# Patient Record
Sex: MALE | Race: BLACK OR AFRICAN AMERICAN | NOT HISPANIC OR LATINO | Employment: FULL TIME | URBAN - METROPOLITAN AREA
[De-identification: names, ages, dates, MRNs, and addresses within clinical notes are randomized per-mention and may not be internally consistent; named-entity substitution may affect disease eponyms.]

---

## 2017-01-16 ENCOUNTER — PATIENT OUTREACH (OUTPATIENT)
Dept: FAMILY MEDICINE CLINIC | Facility: CLINIC | Age: 32
End: 2017-01-16

## 2017-01-17 ENCOUNTER — OFFICE VISIT (OUTPATIENT)
Dept: FAMILY MEDICINE CLINIC | Facility: CLINIC | Age: 32
End: 2017-01-17

## 2017-01-17 VITALS
WEIGHT: 207 LBS | OXYGEN SATURATION: 100 % | SYSTOLIC BLOOD PRESSURE: 131 MMHG | DIASTOLIC BLOOD PRESSURE: 73 MMHG | HEIGHT: 72 IN | HEART RATE: 87 BPM | RESPIRATION RATE: 16 BRPM | TEMPERATURE: 98.1 F | BODY MASS INDEX: 28.04 KG/M2

## 2017-01-17 DIAGNOSIS — E11.8 TYPE 2 DIABETES MELLITUS WITH COMPLICATION, WITHOUT LONG-TERM CURRENT USE OF INSULIN (HCC): ICD-10-CM

## 2017-01-17 LAB
BUN SERPL-MCNC: 11 MG/DL (ref 6–20)
BUN/CREAT SERPL: 12.5 (ref 7–25)
CALCIUM SERPL-MCNC: 9.8 MG/DL (ref 8.6–10.5)
CHLORIDE SERPL-SCNC: 100 MMOL/L (ref 98–107)
CO2 SERPL-SCNC: 27.4 MMOL/L (ref 22–29)
CREAT SERPL-MCNC: 0.88 MG/DL (ref 0.76–1.27)
GLUCOSE SERPL-MCNC: 226 MG/DL (ref 65–99)
HBA1C MFR BLD: 8.61 % (ref 4.8–5.6)
POTASSIUM SERPL-SCNC: 4.5 MMOL/L (ref 3.5–5.2)
SODIUM SERPL-SCNC: 142 MMOL/L (ref 136–145)

## 2017-01-17 PROCEDURE — 99214 OFFICE O/P EST MOD 30 MIN: CPT | Performed by: FAMILY MEDICINE

## 2017-01-17 RX ORDER — GLIMEPIRIDE 2 MG/1
2 TABLET ORAL 2 TIMES DAILY
Qty: 180 TABLET | Refills: 0 | Status: SHIPPED | OUTPATIENT
Start: 2017-01-17 | End: 2017-08-07 | Stop reason: SDUPTHER

## 2017-01-17 NOTE — PATIENT INSTRUCTIONS
Exercise 30 minutes most days of the week  Sleep 6-8 hours each night if possible  Low fat, low cholesterol diet   we discussed prescribed medications and how to take them   make sure you get results of any labs/studies ordered today  Low glycemic index diet   bs  180 in am    Do labs today  And will call labs

## 2017-01-17 NOTE — MR AVS SNAPSHOT
Getachew Hammer   2017 9:00 AM   Office Visit    Provider:  Norm Rodriguez MD   Department:  [facility]   Dept Phone:  [phone]                Your Full Care Plan              Where to Get Your Medications      These medications were sent to [id number] - [place], [place] - [address] - [phone]  - [phone] FX  [address], [facility] [address]     Phone:  [phone]     glimepiride 2 MG tablet            Your Updated Medication List          This list is accurate as of: 17 10:07 AM.  Always use your most recent med list.                glimepiride 2 MG tablet   Commonly known as:  AMARYL   Take 1 tablet by mouth 2 (Two) Times a Day.               We Performed the Following     Basic Metabolic Panel     Hemoglobin A1c       You Were Diagnosed With        Codes Comments    Type 2 diabetes mellitus with complication, without long-term current use of insulin     ICD-10-CM: E11.8  ICD-9-CM: 250.90       Instructions    Exercise 30 minutes most days of the week  Sleep 6-8 hours each night if possible  Low fat, low cholesterol diet   we discussed prescribed medications and how to take them   make sure you get results of any labs/studies ordered today  Low glycemic index diet   bs  180 in am    Do labs today  And will call labs          Patient Instructions History      Frograms Signup     [facility] Frograms allows you to send messages to your doctor, view your test results, renew your prescriptions, schedule appointments, and more. To sign up, go to [employer] and click on the Sign Up Now link in the New User? box. Enter your [employer]grams Activation Code exactly as it appears below along with the last four digits of your Social Security Number and your Date of Birth () to complete the sign-up process. If you do not sign up before the expiration date, you must request a new code.    Frograms  "Activation Code: [email or web address]  Expires: 1/22/2017  5:40 AM    If you have questions, you can email [email or web address] or call [phone] to talk to our MyChart staff. Remember, [employer] is NOT to be used for urgent needs. For medical emergencies, dial 911.               Other Info from Your Visit           Allergies     No Known Allergies      Reason for Visit     Diabetes           Vital Signs     Blood Pressure Pulse Temperature Respirations Height Weight    131/73 87 98.1 °F (36.7 °C) (Oral) 16 72\" (182.9 cm) 207 lb (93.9 kg)    Oxygen Saturation Body Mass Index Smoking Status             100% 28.07 kg/m2 Current Some Day Smoker         Problems and Diagnoses Noted     Type 2 diabetes mellitus with manifestations         Care Plan (most recent)      Care Management - 01/16/17 1432     Lifestyle Goals    Lifestyle Goals Exercise a number of times per week   Pt was at work and did not have a lot of time to speak with me. One goal that he would like to set for himself is to exercise 3 times a week. Currently he is only going once a week to the gym. Pt feels like he eats well,  he avoids simple sugar.     Barriers    Barriers No time to exercise    Self Management     Self Management Increase Physical Activities;Other (See Comment)   Pt states he just needs to make more time to exercise. Will reach out to patient again before next appt with MD.       "

## 2017-01-17 NOTE — PROGRESS NOTES
"Subjective   Getachew Hammer is a 31 y.o. male.     History of Present Illness   Chief Complaint:   Chief Complaint   Patient presents with   • Diabetes       Getachew Hammer 31 y.o. male who presents today for a 3 month diabetic follow up. I refilled his medication for 3 months. He did not have labs for the appointment as requested. He is fasting today. .  he has a history of   Patient Active Problem List   Diagnosis   • Type 2 diabetes mellitus with complication   .  Since the last visit, he has overall felt well.  he has been compliant with   Current Outpatient Prescriptions:   •  glimepiride (AMARYL) 2 MG tablet, Take 1 tablet by mouth 2 (two) times a day., Disp: 180 tablet, Rfl: 1.  he denies medication side effects.    All of the chronic condition(s) listed above are stable w/o issues.    Visit Vitals   • /73   • Pulse 87   • Temp 98.1 °F (36.7 °C) (Oral)   • Resp 16   • Ht 72\" (182.9 cm)   • Wt 207 lb (93.9 kg)   • SpO2 100%   • BMI 28.07 kg/m2             The following portions of the patient's history were reviewed and updated as appropriate: allergies, current medications, past family history, past medical history, past social history, past surgical history and problem list.    Review of Systems   Constitutional: Negative for activity change, appetite change and unexpected weight change.   Eyes: Negative for visual disturbance.   Respiratory: Negative for chest tightness and shortness of breath.    Cardiovascular: Negative for chest pain and palpitations.   Skin: Negative for color change.   Neurological: Negative for speech difficulty.   Psychiatric/Behavioral: Negative for confusion and decreased concentration.       Objective   Physical Exam   Constitutional: He is oriented to person, place, and time. He appears well-developed and well-nourished.   HENT:   Head: Normocephalic.   Eyes: Pupils are equal, round, and reactive to light.   Neck: Normal range of motion.   Cardiovascular: Normal rate and " regular rhythm.    Pulmonary/Chest: Effort normal and breath sounds normal.   Neurological: He is alert and oriented to person, place, and time.   Skin: No rash noted.   Psychiatric: He has a normal mood and affect. His behavior is normal.   Nursing note and vitals reviewed.      Assessment/Plan   Getachew was seen today for diabetes.    Diagnoses and all orders for this visit:    Type 2 diabetes mellitus with complication, without long-term current use of insulin  -     glimepiride (AMARYL) 2 MG tablet; Take 1 tablet by mouth 2 (Two) Times a Day.  -     Basic Metabolic Panel  -     Hemoglobin A1c

## 2017-01-19 DIAGNOSIS — E11.8 TYPE 2 DIABETES MELLITUS WITH COMPLICATION, WITHOUT LONG-TERM CURRENT USE OF INSULIN (HCC): Primary | ICD-10-CM

## 2017-03-19 ENCOUNTER — RESULTS ENCOUNTER (OUTPATIENT)
Dept: FAMILY MEDICINE CLINIC | Facility: CLINIC | Age: 32
End: 2017-03-19

## 2017-03-19 DIAGNOSIS — E11.8 TYPE 2 DIABETES MELLITUS WITH COMPLICATION, WITHOUT LONG-TERM CURRENT USE OF INSULIN (HCC): ICD-10-CM

## 2017-05-05 DIAGNOSIS — E11.8 TYPE 2 DIABETES MELLITUS WITH COMPLICATION, WITHOUT LONG-TERM CURRENT USE OF INSULIN (HCC): ICD-10-CM

## 2017-05-08 RX ORDER — GLIMEPIRIDE 2 MG/1
TABLET ORAL
Qty: 180 TABLET | Refills: 0 | OUTPATIENT
Start: 2017-05-08

## 2017-08-07 ENCOUNTER — OFFICE VISIT (OUTPATIENT)
Dept: FAMILY MEDICINE CLINIC | Facility: CLINIC | Age: 32
End: 2017-08-07

## 2017-08-07 VITALS
OXYGEN SATURATION: 99 % | WEIGHT: 208 LBS | DIASTOLIC BLOOD PRESSURE: 80 MMHG | SYSTOLIC BLOOD PRESSURE: 120 MMHG | HEIGHT: 72 IN | BODY MASS INDEX: 28.17 KG/M2 | RESPIRATION RATE: 16 BRPM | TEMPERATURE: 98.4 F | HEART RATE: 84 BPM

## 2017-08-07 DIAGNOSIS — Z23 IMMUNIZATION DUE: ICD-10-CM

## 2017-08-07 DIAGNOSIS — R21 RASH OF HANDS: ICD-10-CM

## 2017-08-07 DIAGNOSIS — E11.8 TYPE 2 DIABETES MELLITUS WITH COMPLICATION, WITHOUT LONG-TERM CURRENT USE OF INSULIN (HCC): Primary | ICD-10-CM

## 2017-08-07 DIAGNOSIS — E78.2 MIXED HYPERLIPIDEMIA: ICD-10-CM

## 2017-08-07 PROCEDURE — 99214 OFFICE O/P EST MOD 30 MIN: CPT | Performed by: FAMILY MEDICINE

## 2017-08-07 PROCEDURE — 90715 TDAP VACCINE 7 YRS/> IM: CPT | Performed by: FAMILY MEDICINE

## 2017-08-07 PROCEDURE — 90471 IMMUNIZATION ADMIN: CPT | Performed by: FAMILY MEDICINE

## 2017-08-07 RX ORDER — GLIMEPIRIDE 2 MG/1
2 TABLET ORAL 2 TIMES DAILY
Qty: 180 TABLET | Refills: 1 | Status: CANCELLED | OUTPATIENT
Start: 2017-08-07

## 2017-08-07 RX ORDER — GLIMEPIRIDE 2 MG/1
2 TABLET ORAL 2 TIMES DAILY
Qty: 180 TABLET | Refills: 1 | Status: SHIPPED | OUTPATIENT
Start: 2017-08-07 | End: 2018-10-01 | Stop reason: ALTCHOICE

## 2017-08-07 NOTE — PROGRESS NOTES
"Subjective   Getachew Hammer is a 32 y.o. male.     History of Present Illness   Chief Complaint:   Chief Complaint   Patient presents with   • Rash     left hand       Getachew Hammer 32 y.o. male who presents today for Medical management of below issue with medication refills. He complains of a rash on his left hand that has been present for a month. Non specific rash with itching top of left hand  Small area  Try hydrocortisone cream bid  Also discuss diabetes   See last labs  Elevated need to take amaryl  As doing  Not checking bs. Do labs and record bs and see me 1 month,. He had a TDAP in the office today.    he has a history of   Patient Active Problem List   Diagnosis   • Type 2 diabetes mellitus with complication   .  Since the last visit, he has overall felt well.  he has been compliant with   Current Outpatient Prescriptions:   •  glimepiride (AMARYL) 2 MG tablet, Take 1 tablet by mouth 2 (Two) Times a Day., Disp: 180 tablet, Rfl: 0.  he denies medication side effects.    All of the chronic condition(s) listed above are stable w/o issues.    /80  Pulse 84  Temp 98.4 °F (36.9 °C) (Oral)   Resp 16  Ht 72\" (182.9 cm)  Wt 208 lb (94.3 kg)  SpO2 99%  BMI 28.21 kg/m2    Results for orders placed or performed in visit on 01/17/17   Basic Metabolic Panel   Result Value Ref Range    Glucose 226 (H) 65 - 99 mg/dL    BUN 11 6 - 20 mg/dL    Creatinine 0.88 0.76 - 1.27 mg/dL    eGFR Non African Am 100 >60 mL/min/1.73    eGFR African Am 122 >60 mL/min/1.73    BUN/Creatinine Ratio 12.5 7.0 - 25.0    Sodium 142 136 - 145 mmol/L    Potassium 4.5 3.5 - 5.2 mmol/L    Chloride 100 98 - 107 mmol/L    Total CO2 27.4 22.0 - 29.0 mmol/L    Calcium 9.8 8.6 - 10.5 mg/dL   Hemoglobin A1c   Result Value Ref Range    Hemoglobin A1C 8.61 (H) 4.80 - 5.60 %         The following portions of the patient's history were reviewed and updated as appropriate: allergies, current medications, past family history, past medical " history, past social history, past surgical history and problem list.    Review of Systems   Constitutional: Negative for activity change, appetite change and unexpected weight change.   Eyes: Negative for visual disturbance.   Respiratory: Negative for cough and chest tightness.    Cardiovascular: Negative for chest pain and palpitations.   Musculoskeletal: Positive for back pain.   Skin: Positive for rash. Negative for color change.   Neurological: Negative for syncope and speech difficulty.   Psychiatric/Behavioral: Negative for confusion and decreased concentration.       Objective   Physical Exam   Constitutional: He is oriented to person, place, and time. He appears well-developed and well-nourished.   Eyes: Pupils are equal, round, and reactive to light.   Neck: Normal range of motion. Neck supple. No thyromegaly present.   Cardiovascular: Normal rate and regular rhythm.    Pulmonary/Chest: Effort normal and breath sounds normal.   Abdominal: Soft. Bowel sounds are normal.   Musculoskeletal: Normal range of motion.   Neurological: He is alert and oriented to person, place, and time.   Skin: Skin is warm and dry. No rash noted.   Psychiatric: He has a normal mood and affect. His behavior is normal. Thought content normal.   Nursing note and vitals reviewed.      Assessment/Plan   Getachew was seen today for rash, diabetes and back pain.    Diagnoses and all orders for this visit:    Type 2 diabetes mellitus with complication, without long-term current use of insulin  -     glimepiride (AMARYL) 2 MG tablet; Take 1 tablet by mouth 2 (Two) Times a Day.  -     Hemoglobin A1c  -     Comprehensive Metabolic Panel  -     Lipid Panel  -     Urinalysis With Microscopic    Rash of hands  -     Hemoglobin A1c  -     Comprehensive Metabolic Panel  -     Lipid Panel  -     Urinalysis With Microscopic    Immunization due  -     Tdap Vaccine Greater Than or Equal To 6yo IM  -     Hemoglobin A1c  -     Comprehensive Metabolic  Panel  -     Lipid Panel  -     Urinalysis With Microscopic    Mixed hyperlipidemia  -     Hemoglobin A1c  -     Comprehensive Metabolic Panel  -     Lipid Panel  -     Urinalysis With Microscopic    Other orders  -     Cancel: glimepiride (AMARYL) 2 MG tablet; Take 1 tablet by mouth 2 (Two) Times a Day.

## 2017-08-07 NOTE — PATIENT INSTRUCTIONS
Exercise 30 minutes most days of the week  Sleep 6-8 hours each night if possible  Low fat, low cholesterol diet   we discussed prescribed medications and how to take them   make sure you get results of any labs/studies ordered today  Low glycemic index diet     Log bs

## 2018-10-01 ENCOUNTER — OFFICE VISIT (OUTPATIENT)
Dept: FAMILY MEDICINE CLINIC | Facility: CLINIC | Age: 33
End: 2018-10-01

## 2018-10-01 VITALS
WEIGHT: 198 LBS | RESPIRATION RATE: 16 BRPM | OXYGEN SATURATION: 98 % | HEIGHT: 72 IN | HEART RATE: 87 BPM | SYSTOLIC BLOOD PRESSURE: 126 MMHG | BODY MASS INDEX: 26.82 KG/M2 | DIASTOLIC BLOOD PRESSURE: 84 MMHG | TEMPERATURE: 97.4 F

## 2018-10-01 DIAGNOSIS — R73.9 HYPERGLYCEMIA: ICD-10-CM

## 2018-10-01 DIAGNOSIS — E11.8 TYPE 2 DIABETES MELLITUS WITH COMPLICATION, WITHOUT LONG-TERM CURRENT USE OF INSULIN (HCC): Primary | ICD-10-CM

## 2018-10-01 DIAGNOSIS — M54.50 LOW BACK PAIN WITHOUT SCIATICA, UNSPECIFIED BACK PAIN LATERALITY, UNSPECIFIED CHRONICITY: ICD-10-CM

## 2018-10-01 DIAGNOSIS — R11.2 NAUSEA AND VOMITING, INTRACTABILITY OF VOMITING NOT SPECIFIED, UNSPECIFIED VOMITING TYPE: ICD-10-CM

## 2018-10-01 PROCEDURE — 99214 OFFICE O/P EST MOD 30 MIN: CPT | Performed by: FAMILY MEDICINE

## 2018-10-01 RX ORDER — GLIPIZIDE 5 MG/1
5 TABLET, FILM COATED, EXTENDED RELEASE ORAL DAILY
Qty: 30 TABLET | Refills: 0 | Status: SHIPPED | OUTPATIENT
Start: 2018-10-01 | End: 2018-10-17 | Stop reason: SDUPTHER

## 2018-10-01 RX ORDER — GLIMEPIRIDE 2 MG/1
2 TABLET ORAL
Qty: 180 TABLET | Refills: 1 | Status: CANCELLED | OUTPATIENT
Start: 2018-10-01

## 2018-10-01 NOTE — PROGRESS NOTES
Subjective   Chief Complaint:   Chief Complaint   Patient presents with   • Fatigue     Blood Sugar Taken  Left Middle finger 257   • Back Pain   • Abdominal Pain   • Headache         History of Present Illness   I have not seen patient for over one year.  Sugar today was 257 he's been out of his Amaryl 2 mg twice a day for months.  He has not been our office for over a year.  Last LSR was August 2017.  His blood pressures 126/84.  Heart rate 87.  Temperature is 97.4.  He does not have a fever chills or anything.  It been off his Amaryl 2 mg twice a day for months.  He has a viral syndrome.  I will get labs on him today now.  I probably want to see him back tomorrow afternoon if I get the labs back but I'll call him in the morning to see what labs I have back.  Told him to hydrate himself today.  eNva get labs now.  Would've him on Glucotrol XL 5 mg 1 a day.  And his blood sugar today was 257.  Blood sugar I have in the chart was 226 and the hemoglobin A1c was 8.61.  Labs now and I will see him back tomorrow afternoon possibly 5 the labs back.  He can call me or go to the ER if he has any more vomiting or anything like that.            Getachew Hammer 33 y.o. male who presents today for Medical Management of the below listed issues and medication refills.  Her stance go to the ER if he starts vomiting and gets sicker.  I'm going to call him his labs in the morning.  He's going to go to the lab now.     he has a problem list of   Patient Active Problem List   Diagnosis   • Type 2 diabetes mellitus with complication (CMS/[facility])   .  Since the last visit, he has overall felt well.  he has been compliant with   Current Outpatient Prescriptions:   •  glipiZIDE (GLUCOTROL XL) 5 MG ER tablet, Take 1 tablet by mouth Daily., Disp: 30 tablet, Rfl: 0.  he denies medication side effects.  Did not take metformin because of vomiting.    All of the chronic condition(s) listed above are stable w/o issues.    /84   Pulse 87    "Temp 97.4 °F (36.3 °C)   Resp 16   Ht 182.9 cm (72\")   Wt 89.8 kg (198 lb)   SpO2 98%   BMI 26.85 kg/m²     Results for orders placed or performed in visit on 01/17/17   Basic Metabolic Panel   Result Value Ref Range    Glucose 226 (H) 65 - 99 mg/dL    BUN 11 6 - 20 mg/dL    Creatinine 0.88 0.76 - 1.27 mg/dL    eGFR Non African Am 100 >60 mL/min/1.73    eGFR African Am 122 >60 mL/min/1.73    BUN/Creatinine Ratio 12.5 7.0 - 25.0    Sodium 142 136 - 145 mmol/L    Potassium 4.5 3.5 - 5.2 mmol/L    Chloride 100 98 - 107 mmol/L    Total CO2 27.4 22.0 - 29.0 mmol/L    Calcium 9.8 8.6 - 10.5 mg/dL   Hemoglobin A1c   Result Value Ref Range    Hemoglobin A1C 8.61 (H) 4.80 - 5.60 %             The following portions of the patient's history were reviewed and updated as appropriate: allergies, current medications, past family history, past medical history, past social history, past surgical history and problem list.    Review of Systems   Constitutional: Positive for fatigue. Negative for chills, fever and unexpected weight change.   HENT: Negative for sinus pain and sinus pressure.    Eyes: Negative for visual disturbance.   Respiratory: Negative for shortness of breath.    Cardiovascular: Negative for chest pain.   Gastrointestinal: Negative for abdominal distention, abdominal pain, nausea and vomiting.   Genitourinary: Negative for decreased urine volume and frequency.   Musculoskeletal: Positive for back pain.   Skin: Negative for rash.   Neurological: Positive for weakness and headaches.   Psychiatric/Behavioral: The patient is not nervous/anxious.        Objective   Physical Exam   Constitutional: He is oriented to person, place, and time.   HENT:   Head: Normocephalic.   Right Ear: External ear normal.   Mouth/Throat: No oropharyngeal exudate.   Eyes: Pupils are equal, round, and reactive to light.   Neck: Normal range of motion.   Cardiovascular: Normal rate and regular rhythm.    Pulmonary/Chest: Effort normal " and breath sounds normal. No respiratory distress. He has no wheezes. He has no rales.   Abdominal: Soft. Bowel sounds are normal. There is no tenderness.   Musculoskeletal: He exhibits no tenderness.   Lymphadenopathy:     He has no cervical adenopathy.   Neurological: He is alert and oriented to person, place, and time. No sensory deficit.   Skin: No erythema.   Psychiatric: He has a normal mood and affect. His behavior is normal.   Nursing note and vitals reviewed.      Assessment/Plan   Getachew was seen today for fatigue, back pain, abdominal pain and headache.    Diagnoses and all orders for this visit:    Type 2 diabetes mellitus with complication, without long-term current use of insulin (CMS/[facility])  Comments:  bs 257 and non compliant  Orders:  -     Comprehensive metabolic panel  -     Lipid panel  -     CBC and Differential  -     TSH  -     Urinalysis With Microscopic - Urine, Clean Catch  -     Urine Culture - Urine, Urine, Clean Catch  -     Hemoglobin A1c  -     Ambulatory Referral to Endocrinology    Nausea and vomiting, intractability of vomiting not specified, unspecified vomiting type  -     Comprehensive metabolic panel  -     Lipid panel  -     CBC and Differential  -     TSH  -     Urinalysis With Microscopic - Urine, Clean Catch  -     Urine Culture - Urine, Urine, Clean Catch  -     Hemoglobin A1c  -     Ambulatory Referral to Endocrinology    Hyperglycemia  -     Comprehensive metabolic panel  -     Lipid panel  -     CBC and Differential  -     TSH  -     Urinalysis With Microscopic - Urine, Clean Catch  -     Urine Culture - Urine, Urine, Clean Catch  -     Hemoglobin A1c  -     Ambulatory Referral to Endocrinology    Low back pain without sciatica, unspecified back pain laterality, unspecified chronicity  -     Comprehensive metabolic panel  -     Lipid panel  -     CBC and Differential  -     TSH  -     Urinalysis With Microscopic - Urine, Clean Catch  -     Urine Culture - Urine, Urine,  Clean Catch  -     Hemoglobin A1c  -     Ambulatory Referral to Endocrinology    Other orders  -     Cancel: glimepiride (AMARYL) 2 MG tablet; Take 1 tablet by mouth Every Morning Before Breakfast.  -     glipiZIDE (GLUCOTROL XL) 5 MG ER tablet; Take 1 tablet by mouth Daily.

## 2018-10-01 NOTE — PATIENT INSTRUCTIONS
Exercise 30 minutes most days of the week  Sleep 6-8 hours each night if possible  Low fat, low cholesterol diet   we discussed prescribed medications and how to take them   make sure you get results of any labs/studies ordered today  Low glycemic index diet   Tlabs nowo er if any problems    Will call you and see you tuesday

## 2018-10-03 LAB
ALBUMIN SERPL-MCNC: 5.1 G/DL (ref 3.5–5.2)
ALBUMIN/GLOB SERPL: 1.8 G/DL
ALP SERPL-CCNC: 78 U/L (ref 39–117)
ALT SERPL-CCNC: 21 U/L (ref 1–41)
APPEARANCE UR: CLEAR
AST SERPL-CCNC: 16 U/L (ref 1–40)
BACTERIA #/AREA URNS HPF: ABNORMAL /HPF
BACTERIA UR CULT: NO GROWTH
BACTERIA UR CULT: NORMAL
BASOPHILS # BLD AUTO: 0.04 10*3/MM3 (ref 0–0.2)
BASOPHILS NFR BLD AUTO: 0.6 % (ref 0–1.5)
BILIRUB SERPL-MCNC: 1 MG/DL (ref 0.1–1.2)
BILIRUB UR QL STRIP: NEGATIVE
BUN SERPL-MCNC: 12 MG/DL (ref 6–20)
BUN/CREAT SERPL: 13.5 (ref 7–25)
CALCIUM SERPL-MCNC: 10.4 MG/DL (ref 8.6–10.5)
CASTS URNS MICRO: ABNORMAL
CHLORIDE SERPL-SCNC: 97 MMOL/L (ref 98–107)
CHOLEST SERPL-MCNC: 191 MG/DL (ref 0–200)
CO2 SERPL-SCNC: 26.8 MMOL/L (ref 22–29)
COLOR UR: YELLOW
CREAT SERPL-MCNC: 0.89 MG/DL (ref 0.76–1.27)
DIFFERENTIAL COMMENT: NORMAL
EOSINOPHIL # BLD AUTO: 0.12 10*3/MM3 (ref 0–0.7)
EOSINOPHIL NFR BLD AUTO: 1.8 % (ref 0.3–6.2)
EPI CELLS #/AREA URNS HPF: ABNORMAL /HPF
ERYTHROCYTE [DISTWIDTH] IN BLOOD BY AUTOMATED COUNT: 14.1 % (ref 11.5–14.5)
GLOBULIN SER CALC-MCNC: 2.9 GM/DL
GLUCOSE SERPL-MCNC: 285 MG/DL (ref 65–99)
GLUCOSE UR QL: (no result)
HBA1C MFR BLD: 10.61 % (ref 4.8–5.6)
HCT VFR BLD AUTO: 46.1 % (ref 40.4–52.2)
HDLC SERPL-MCNC: 58 MG/DL (ref 40–60)
HGB BLD-MCNC: 14.7 G/DL (ref 13.7–17.6)
HGB UR QL STRIP: NEGATIVE
IMM GRANULOCYTES # BLD: 0.01 10*3/MM3 (ref 0–0.03)
IMM GRANULOCYTES NFR BLD: 0.1 % (ref 0–0.5)
KETONES UR QL STRIP: (no result)
LDLC SERPL CALC-MCNC: 116 MG/DL (ref 0–100)
LEUKOCYTE ESTERASE UR QL STRIP: NEGATIVE
LYMPHOCYTES # BLD AUTO: 2.45 10*3/MM3 (ref 0.9–4.8)
LYMPHOCYTES NFR BLD AUTO: 36.4 % (ref 19.6–45.3)
MCH RBC QN AUTO: 22.4 PG (ref 27–32.7)
MCHC RBC AUTO-ENTMCNC: 31.9 G/DL (ref 32.6–36.4)
MCV RBC AUTO: 70.2 FL (ref 79.8–96.2)
MONOCYTES # BLD AUTO: 0.49 10*3/MM3 (ref 0.2–1.2)
MONOCYTES NFR BLD AUTO: 7.3 % (ref 5–12)
NEUTROPHILS # BLD AUTO: 3.64 10*3/MM3 (ref 1.9–8.1)
NEUTROPHILS NFR BLD AUTO: 53.9 % (ref 42.7–76)
NITRITE UR QL STRIP: NEGATIVE
PH UR STRIP: 6.5 [PH] (ref 5–8)
PLATELET # BLD AUTO: 312 10*3/MM3 (ref 140–500)
PLATELET BLD QL SMEAR: NORMAL
POTASSIUM SERPL-SCNC: 4.9 MMOL/L (ref 3.5–5.2)
PROT SERPL-MCNC: 8 G/DL (ref 6–8.5)
PROT UR QL STRIP: (no result)
RBC # BLD AUTO: 6.57 10*6/MM3 (ref 4.6–6)
RBC #/AREA URNS HPF: ABNORMAL /HPF
RBC MORPH BLD: NORMAL
SODIUM SERPL-SCNC: 139 MMOL/L (ref 136–145)
SP GR UR: (no result) (ref 1–1.03)
TRIGL SERPL-MCNC: 87 MG/DL (ref 0–150)
TSH SERPL DL<=0.005 MIU/L-ACNC: 1.09 MIU/ML (ref 0.27–4.2)
UROBILINOGEN UR STRIP-MCNC: (no result) MG/DL
VLDLC SERPL CALC-MCNC: 17.4 MG/DL (ref 5–40)
WBC # BLD AUTO: 6.74 10*3/MM3 (ref 4.5–10.7)
WBC #/AREA URNS HPF: ABNORMAL /HPF

## 2018-10-17 ENCOUNTER — OFFICE VISIT (OUTPATIENT)
Dept: FAMILY MEDICINE CLINIC | Facility: CLINIC | Age: 33
End: 2018-10-17

## 2018-10-17 VITALS
OXYGEN SATURATION: 98 % | HEART RATE: 84 BPM | TEMPERATURE: 98.2 F | SYSTOLIC BLOOD PRESSURE: 120 MMHG | BODY MASS INDEX: 27.5 KG/M2 | RESPIRATION RATE: 16 BRPM | HEIGHT: 72 IN | DIASTOLIC BLOOD PRESSURE: 80 MMHG | WEIGHT: 203 LBS

## 2018-10-17 DIAGNOSIS — E11.8 TYPE 2 DIABETES MELLITUS WITH COMPLICATION, WITHOUT LONG-TERM CURRENT USE OF INSULIN (HCC): Primary | ICD-10-CM

## 2018-10-17 PROCEDURE — 99214 OFFICE O/P EST MOD 30 MIN: CPT | Performed by: FAMILY MEDICINE

## 2018-10-17 RX ORDER — GLIPIZIDE 5 MG/1
5 TABLET, FILM COATED, EXTENDED RELEASE ORAL DAILY
Qty: 90 TABLET | Refills: 1 | Status: SHIPPED | OUTPATIENT
Start: 2018-10-17 | End: 2019-11-15

## 2018-10-17 NOTE — PATIENT INSTRUCTIONS
Exercise 30 minutes most days of the week  Sleep 6-8 hours each night if possible  Low fat, low cholesterol diet   we discussed prescribed medications and how to take them   make sure you get results of any labs/studies ordered today  Low glycemic index diet   Add januvia  100mg q day   Call me list of bs 2 weeks   Patient

## 2018-10-17 NOTE — PROGRESS NOTES
"Subjective   Chief Complaint: No chief complaint on file.        History of Present Illness is a follow-up visit to 2 weeks ago when he came in with a flu syndrome with high blood sugar.  Sugar that time was 257 and his hemoglobin A1c was 10.6.  Had a viral syndrome probably.  Not on anything for his diabetes.  Him Glucotrol XL 5 one a day comes in today for follow-up and to go over his labs.  Again his blood sugar was 257 and his hemoglobin A1c was 10.61  Two  weeks ago.  He feels better now and he is able to be hydrated now.  His blood sugar at home was in 59 last night at bedtime.  I'm going to continue with the Glucotrol XL 5 in the morning and him and add Januvia 100 mg 1 a day at supper.  He is to call me in 2 weeks his labs.  I told him to check blood sugars in the morning and then before supper and then at bedtime as much better now.  I reviewed all his labs he did not have a urinary tract infection.  His white count was not elevated.  And he feels fine now.  Range him to watch for low blood sugars.            Getachew Hammer 33 y.o. male who presents today for Medical Management of the below listed issues and medication refills.    ICD-10-CM ICD-9-CM   1. Type 2 diabetes mellitus with complication, without long-term current use of insulin (CMS/[facility]) E11.8 250.90        he has a problem list of   Patient Active Problem List   Diagnosis   • Type 2 diabetes mellitus with complication (CMS/[facility])   .  Since the last visit, he has overall felt well.  he has been compliant with   Current Outpatient Prescriptions:   •  glipiZIDE (GLUCOTROL XL) 5 MG ER tablet, Take 1 tablet by mouth Daily., Disp: 30 tablet, Rfl: 0.  he denies medication side effects.    All of the chronic condition(s) listed above are stable w/o issues.    /80   Pulse 84   Temp 98.2 °F (36.8 °C)   Resp 16   Ht 182.9 cm (72\")   Wt 92.1 kg (203 lb)   SpO2 98%   BMI 27.53 kg/m²     Results for orders placed or performed in visit on 10/01/18 "   Urine Culture - Urine, Urine, Clean Catch   Result Value Ref Range    Urine Culture Final report     Result 1 No growth    Comprehensive metabolic panel   Result Value Ref Range    Glucose 285 (H) 65 - 99 mg/dL    BUN 12 6 - 20 mg/dL    Creatinine 0.89 0.76 - 1.27 mg/dL    eGFR Non African Am 98 >60 mL/min/1.73    eGFR African Am 119 >60 mL/min/1.73    BUN/Creatinine Ratio 13.5 7.0 - 25.0    Sodium 139 136 - 145 mmol/L    Potassium 4.9 3.5 - 5.2 mmol/L    Chloride 97 (L) 98 - 107 mmol/L    Total CO2 26.8 22.0 - 29.0 mmol/L    Calcium 10.4 8.6 - 10.5 mg/dL    Total Protein 8.0 6.0 - 8.5 g/dL    Albumin 5.10 3.50 - 5.20 g/dL    Globulin 2.9 gm/dL    A/G Ratio 1.8 g/dL    Total Bilirubin 1.0 0.1 - 1.2 mg/dL    Alkaline Phosphatase 78 39 - 117 U/L    AST (SGOT) 16 1 - 40 U/L    ALT (SGPT) 21 1 - 41 U/L   Lipid panel   Result Value Ref Range    Total Cholesterol 191 0 - 200 mg/dL    Triglycerides 87 0 - 150 mg/dL    HDL Cholesterol 58 40 - 60 mg/dL    VLDL Cholesterol 17.4 5 - 40 mg/dL    LDL Cholesterol  116 (H) 0 - 100 mg/dL   TSH   Result Value Ref Range    TSH 1.090 0.270 - 4.200 mIU/mL   Hemoglobin A1c   Result Value Ref Range    Hemoglobin A1C 10.61 (H) 4.80 - 5.60 %   Microscopic Examination   Result Value Ref Range    WBC, UA 0-2 /hpf    RBC, UA 3-5 (A) /hpf    Epithelial Cells (non renal) 0-2 /hpf    Cast Type Comment     Bacteria, UA Comment None Seen /hpf   CBC and Differential   Result Value Ref Range    WBC 6.74 4.50 - 10.70 10*3/mm3    RBC 6.57 (H) 4.60 - 6.00 10*6/mm3    Hemoglobin 14.7 13.7 - 17.6 g/dL    Hematocrit 46.1 40.4 - 52.2 %    MCV 70.2 (L) 79.8 - 96.2 fL    MCH 22.4 (L) 27.0 - 32.7 pg    MCHC 31.9 (L) 32.6 - 36.4 g/dL    RDW 14.1 11.5 - 14.5 %    Platelets 312 140 - 500 10*3/mm3    Neutrophil Rel % 53.9 42.7 - 76.0 %    Lymphocyte Rel % 36.4 19.6 - 45.3 %    Monocyte Rel % 7.3 5.0 - 12.0 %    Eosinophil Rel % 1.8 0.3 - 6.2 %    Basophil Rel % 0.6 0.0 - 1.5 %    Neutrophils Absolute 3.64 1.90  - 8.10 10*3/mm3    Lymphocytes Absolute 2.45 0.90 - 4.80 10*3/mm3    Monocytes Absolute 0.49 0.20 - 1.20 10*3/mm3    Eosinophils Absolute 0.12 0.00 - 0.70 10*3/mm3    Basophils Absolute 0.04 0.00 - 0.20 10*3/mm3    Immature Granulocyte Rel % 0.1 0.0 - 0.5 %    Immature Grans Absolute 0.01 0.00 - 0.03 10*3/mm3   Urinalysis With Microscopic - Urine, Clean Catch   Result Value Ref Range    Specific Gravity, UA Comment 1.005 - 1.030    pH, UA 6.5 5.0 - 8.0    Color, UA Yellow     Appearance, UA Clear Clear    Leukocytes, UA Negative Negative    Protein Trace (A) Negative    Glucose, UA See below: (A) Negative    Ketones See below: (A) Negative    Blood, UA Negative Negative    Bilirubin, UA Negative Negative    Urobilinogen, UA Comment     Nitrite, UA Negative Negative   Manual Differential   Result Value Ref Range    Differential Comment Comment     Comment Comment     Plt Comment Comment              The following portions of the patient's history were reviewed and updated as appropriate: allergies, current medications, past family history, past medical history, past social history, past surgical history and problem list.    Review of Systems   Constitutional: Negative for activity change, appetite change and unexpected weight change.   Eyes: Negative for visual disturbance.   Respiratory: Negative for chest tightness and shortness of breath.    Cardiovascular: Negative for chest pain and palpitations.   Skin: Negative for color change.   Neurological: Negative for syncope and speech difficulty.   Psychiatric/Behavioral: Negative for confusion and decreased concentration.       Objective   Physical Exam   Constitutional: He is oriented to person, place, and time. He appears well-developed and well-nourished.   HENT:   Head: Atraumatic.   Mouth/Throat: Oropharynx is clear and moist.   Eyes: Pupils are equal, round, and reactive to light. EOM are normal.   Neck: Normal range of motion. Neck supple. No thyromegaly  present.   Cardiovascular: Normal rate and regular rhythm.    Pulmonary/Chest: Effort normal and breath sounds normal.   Abdominal: Soft.   Musculoskeletal: Normal range of motion.   Neurological: He is alert and oriented to person, place, and time.   Skin: Skin is warm and dry.   Psychiatric: He has a normal mood and affect. His behavior is normal.   Nursing note and vitals reviewed.      Assessment/Plan   Diagnoses and all orders for this visit:    Type 2 diabetes mellitus with complication, without long-term current use of insulin (CMS/[facility])    Other orders  -     glipiZIDE (GLUCOTROL XL) 5 MG ER tablet; Take 1 tablet by mouth Daily.

## 2018-12-10 ENCOUNTER — OFFICE VISIT (OUTPATIENT)
Dept: ENDOCRINOLOGY | Age: 33
End: 2018-12-10

## 2018-12-10 VITALS
BODY MASS INDEX: 28.31 KG/M2 | HEIGHT: 72 IN | SYSTOLIC BLOOD PRESSURE: 128 MMHG | WEIGHT: 209 LBS | DIASTOLIC BLOOD PRESSURE: 72 MMHG

## 2018-12-10 DIAGNOSIS — E78.5 DYSLIPIDEMIA: ICD-10-CM

## 2018-12-10 DIAGNOSIS — R53.82 CHRONIC FATIGUE: ICD-10-CM

## 2018-12-10 DIAGNOSIS — E55.9 VITAMIN D DEFICIENCY: ICD-10-CM

## 2018-12-10 DIAGNOSIS — E11.9 TYPE 2 DIABETES MELLITUS WITHOUT COMPLICATION, WITHOUT LONG-TERM CURRENT USE OF INSULIN (HCC): Primary | ICD-10-CM

## 2018-12-10 DIAGNOSIS — N52.9 ERECTILE DYSFUNCTION, UNSPECIFIED ERECTILE DYSFUNCTION TYPE: ICD-10-CM

## 2018-12-10 PROCEDURE — 99204 OFFICE O/P NEW MOD 45 MIN: CPT | Performed by: INTERNAL MEDICINE

## 2018-12-10 RX ORDER — ERTUGLIFLOZIN 15 MG/1
15 TABLET, FILM COATED ORAL EVERY MORNING
Qty: 30 TABLET | Refills: 5 | Status: SHIPPED | OUTPATIENT
Start: 2018-12-10 | End: 2019-03-13 | Stop reason: SDUPTHER

## 2018-12-10 RX ORDER — SITAGLIPTIN AND METFORMIN HYDROCHLORIDE 1000; 50 MG/1; MG/1
2 TABLET, FILM COATED, EXTENDED RELEASE ORAL DAILY
Qty: 60 TABLET | Refills: 5 | Status: SHIPPED | OUTPATIENT
Start: 2018-12-10 | End: 2019-03-13 | Stop reason: SDUPTHER

## 2018-12-10 RX ORDER — PIOGLITAZONEHYDROCHLORIDE 30 MG/1
30 TABLET ORAL DAILY
Qty: 30 TABLET | Refills: 11 | Status: SHIPPED | OUTPATIENT
Start: 2018-12-10 | End: 2019-04-25

## 2018-12-10 NOTE — PROGRESS NOTES
"Subjective   Getachew Hammer is a 33 y.o. male is here for as a new patient for type 2 dm. Lab review. Pt is testing BG at least once a day. He is unsure why he is here. /72   Ht 182.9 cm (72\")   Wt 94.8 kg (209 lb)   BMI 28.35 kg/m²   No Known Allergies    Current Outpatient Medications:   •  glipiZIDE (GLUCOTROL XL) 5 MG ER tablet, Take 1 tablet by mouth Daily., Disp: 90 tablet, Rfl: 1      History of Present Illness this is a 33-year-old gentleman who is being referred for further evaluation and treatment of type II diabetes.  He says she has had type II diabetes since age 20 and he took metformin generic for some time and because of GI intolerance he stopped using it and for the rest of his the 20s he really did not care and did not take care of himself.  He is now  and has a steady job and children.  Family history is a strong for type II diabetes and cardiovascular problems.  He is complaining of erectile dysfunction.    The following portions of the patient's history were reviewed and updated as appropriate: allergies, current medications, past family history, past medical history, past social history, past surgical history and problem list.    Review of Systems   Constitutional: Negative for fatigue.   HENT: Negative for trouble swallowing.    Eyes: Negative for visual disturbance.   Cardiovascular: Negative for leg swelling.   Endocrine: Negative for polyphagia.   Skin: Negative for wound.   Neurological: Negative for numbness.       Objective   Physical Exam   Constitutional: He is oriented to person, place, and time. He appears well-developed and well-nourished. No distress.   HENT:   Head: Normocephalic and atraumatic.   Right Ear: External ear normal.   Left Ear: External ear normal.   Nose: Nose normal.   Mouth/Throat: Oropharynx is clear and moist. No oropharyngeal exudate.   Eyes: Conjunctivae and EOM are normal. Pupils are equal, round, and reactive to light. Right eye exhibits no " discharge. Left eye exhibits no discharge. No scleral icterus.   Neck: Normal range of motion. Neck supple. No JVD present. No tracheal deviation present. No thyromegaly present.   Cardiovascular: Normal rate, regular rhythm, normal heart sounds and intact distal pulses. Exam reveals no gallop and no friction rub.   No murmur heard.  Pulmonary/Chest: Effort normal and breath sounds normal. No stridor. No respiratory distress. He has no wheezes. He has no rales. He exhibits no tenderness.   Abdominal: Soft. Bowel sounds are normal. He exhibits no distension and no mass. There is no tenderness. There is no rebound and no guarding. No hernia.   Musculoskeletal: Normal range of motion. He exhibits no edema, tenderness or deformity.   Lymphadenopathy:     He has no cervical adenopathy.   Neurological: He is alert and oriented to person, place, and time. He displays normal reflexes. No cranial nerve deficit or sensory deficit. He exhibits normal muscle tone. Coordination normal.   Skin: Skin is warm and dry. No rash noted. He is not diaphoretic. No erythema. No pallor.   Psychiatric: He has a normal mood and affect. His behavior is normal. Judgment and thought content normal.   Nursing note and vitals reviewed.        Assessment/Plan   Diagnoses and all orders for this visit:    Type 2 diabetes mellitus without complication, without long-term current use of insulin (CMS/[facility])  -     T4 & TSH ([facility])  -     TestT+TestF+SHBG  -     Uric Acid  -     Vitamin D 25 Hydroxy  -     Comprehensive Metabolic Panel  -     C-Peptide  -     Follicle Stimulating Hormone  -     Hemoglobin A1c  -     Lipid Panel  -     Luteinizing Hormone  -     MicroAlbumin, Urine, Random - Urine, Clean Catch  -     PSA DIAGNOSTIC  -     Prolactin  -     ACTH  -     Cortisol  -     Ambulatory Referral to Podiatry  -     T4 & TSH ([facility]); Future  -     TestT+TestF+SHBG; Future  -     Uric Acid; Future  -     Vitamin D 25 Hydroxy; Future  -      Comprehensive Metabolic Panel; Future  -     C-Peptide; Future  -     Hemoglobin A1c; Future  -     Lipid Panel; Future  -     MicroAlbumin, Urine, Random - Urine, Clean Catch; Future    Chronic fatigue  -     T4 & TSH ([facility])  -     TestT+TestF+SHBG  -     Uric Acid  -     Vitamin D 25 Hydroxy  -     Comprehensive Metabolic Panel  -     C-Peptide  -     Follicle Stimulating Hormone  -     Hemoglobin A1c  -     Lipid Panel  -     Luteinizing Hormone  -     MicroAlbumin, Urine, Random - Urine, Clean Catch  -     PSA DIAGNOSTIC  -     Prolactin  -     ACTH  -     Cortisol  -     T4 & TSH ([facility]); Future  -     TestT+TestF+SHBG; Future  -     Uric Acid; Future  -     Vitamin D 25 Hydroxy; Future  -     Comprehensive Metabolic Panel; Future  -     C-Peptide; Future  -     Hemoglobin A1c; Future  -     Lipid Panel; Future  -     MicroAlbumin, Urine, Random - Urine, Clean Catch; Future    Vitamin D deficiency  -     T4 & TSH ([facility])  -     TestT+TestF+SHBG  -     Uric Acid  -     Vitamin D 25 Hydroxy  -     Comprehensive Metabolic Panel  -     C-Peptide  -     Follicle Stimulating Hormone  -     Hemoglobin A1c  -     Lipid Panel  -     Luteinizing Hormone  -     MicroAlbumin, Urine, Random - Urine, Clean Catch  -     PSA DIAGNOSTIC  -     Prolactin  -     ACTH  -     Cortisol  -     T4 & TSH ([facility]); Future  -     TestT+TestF+SHBG; Future  -     Uric Acid; Future  -     Vitamin D 25 Hydroxy; Future  -     Comprehensive Metabolic Panel; Future  -     C-Peptide; Future  -     Hemoglobin A1c; Future  -     Lipid Panel; Future  -     MicroAlbumin, Urine, Random - Urine, Clean Catch; Future    Dyslipidemia  -     T4 & TSH ([facility])  -     TestT+TestF+SHBG  -     Uric Acid  -     Vitamin D 25 Hydroxy  -     Comprehensive Metabolic Panel  -     C-Peptide  -     Follicle Stimulating Hormone  -     Hemoglobin A1c  -     Lipid Panel  -     Luteinizing Hormone  -     MicroAlbumin, Urine, Random - Urine, Clean Catch  -      PSA DIAGNOSTIC  -     Prolactin  -     ACTH  -     Cortisol  -     T4 & TSH ([facility]); Future  -     TestT+TestF+SHBG; Future  -     Uric Acid; Future  -     Vitamin D 25 Hydroxy; Future  -     Comprehensive Metabolic Panel; Future  -     C-Peptide; Future  -     Hemoglobin A1c; Future  -     Lipid Panel; Future  -     MicroAlbumin, Urine, Random - Urine, Clean Catch; Future    Erectile dysfunction, unspecified erectile dysfunction type  -     T4 & TSH ([facility]); Future  -     TestT+TestF+SHBG; Future  -     Uric Acid; Future  -     Vitamin D 25 Hydroxy; Future  -     Comprehensive Metabolic Panel; Future  -     C-Peptide; Future  -     Hemoglobin A1c; Future  -     Lipid Panel; Future  -     MicroAlbumin, Urine, Random - Urine, Clean Catch; Future    Other orders  -     JANUMET XR  MG tablet; Take 2 tablets by mouth Daily.  -     STEGLATRO 15 MG tablet; Take 1 tablet by mouth Every Morning.  -     pioglitazone (ACTOS) 30 MG tablet; Take 1 tablet by mouth Daily.               In his summary I saw and examined this 33-year-old gentleman for above-mentioned problems.  I reviewed his laboratory evaluation of 10/01/2018 and at this time we will go ahead and order a more extensive laboratory evaluation and once the results come back we will go ahead and call for any possible modification or new medications.  In the meantime I started him on Janumet XR 50/1000 mg to be taken 2 tablets with supper and also stick O5 milligrams every morning for 3 weeks and if there was no problem he should go to 15 mg every morning and Actos 30 mg daily.  I am also referring him for diabetic foot care to Dr. Leo Walker.  He will see Ms. Ragini Shah in 4 months or sooner if needed with laboratory evaluation prior to each office visit.

## 2018-12-12 LAB
25(OH)D3+25(OH)D2 SERPL-MCNC: 18.6 NG/ML (ref 30–100)
ACTH PLAS-MCNC: 36.1 PG/ML (ref 7.2–63.3)
ALBUMIN SERPL-MCNC: 5.1 G/DL (ref 3.5–5.2)
ALBUMIN/GLOB SERPL: 2.1 G/DL
ALP SERPL-CCNC: 65 U/L (ref 39–117)
ALT SERPL-CCNC: 81 U/L (ref 1–41)
AST SERPL-CCNC: 155 U/L (ref 1–40)
BILIRUB SERPL-MCNC: 0.6 MG/DL (ref 0.1–1.2)
BUN SERPL-MCNC: 9 MG/DL (ref 6–20)
BUN/CREAT SERPL: 10.1 (ref 7–25)
C PEPTIDE SERPL-MCNC: 1.2 NG/ML (ref 1.1–4.4)
CALCIUM SERPL-MCNC: 10.5 MG/DL (ref 8.6–10.5)
CHLORIDE SERPL-SCNC: 100 MMOL/L (ref 98–107)
CHOLEST SERPL-MCNC: 169 MG/DL (ref 0–200)
CO2 SERPL-SCNC: 29.2 MMOL/L (ref 22–29)
CORTIS SERPL-MCNC: 6.6 UG/DL
CREAT SERPL-MCNC: 0.89 MG/DL (ref 0.76–1.27)
FSH SERPL-ACNC: 6.8 MIU/ML (ref 1.5–12.4)
GLOBULIN SER CALC-MCNC: 2.4 GM/DL
GLUCOSE SERPL-MCNC: 234 MG/DL (ref 65–99)
HBA1C MFR BLD: 8.06 % (ref 4.8–5.6)
HDLC SERPL-MCNC: 53 MG/DL (ref 40–60)
INTERPRETATION: NORMAL
LDLC SERPL CALC-MCNC: 104 MG/DL (ref 0–100)
LH SERPL-ACNC: 5.1 MIU/ML (ref 1.7–8.6)
Lab: NORMAL
MICROALBUMIN UR-MCNC: 27.5 UG/ML
POTASSIUM SERPL-SCNC: 4.6 MMOL/L (ref 3.5–5.2)
PROLACTIN SERPL-MCNC: 11.3 NG/ML (ref 4–15.2)
PROT SERPL-MCNC: 7.5 G/DL (ref 6–8.5)
PSA SERPL-MCNC: 1.92 NG/ML (ref 0–4)
SHBG SERPL-SCNC: 23.4 NMOL/L (ref 16.5–55.9)
SODIUM SERPL-SCNC: 141 MMOL/L (ref 136–145)
T4 SERPL-MCNC: 8.27 MCG/DL (ref 4.5–11.7)
TESTOST FREE SERPL-MCNC: 9.9 PG/ML (ref 8.7–25.1)
TESTOST SERPL-MCNC: 279 NG/DL (ref 264–916)
TRIGL SERPL-MCNC: 60 MG/DL (ref 0–150)
TSH SERPL DL<=0.005 MIU/L-ACNC: 1.41 MIU/ML (ref 0.27–4.2)
URATE SERPL-MCNC: 4.8 MG/DL (ref 3.4–7)
VLDLC SERPL CALC-MCNC: 12 MG/DL (ref 5–40)

## 2018-12-12 RX ORDER — ERGOCALCIFEROL 1.25 MG/1
50000 CAPSULE ORAL 2 TIMES WEEKLY
Qty: 26 CAPSULE | Refills: 3 | Status: SHIPPED | OUTPATIENT
Start: 2018-12-13 | End: 2019-03-13 | Stop reason: SDUPTHER

## 2019-03-12 ENCOUNTER — TELEPHONE (OUTPATIENT)
Dept: ENDOCRINOLOGY | Age: 34
End: 2019-03-12

## 2019-03-12 NOTE — TELEPHONE ENCOUNTER
----- Message from Sharyn Ricks MD sent at 3/10/2019  1:01 PM EDT -----  Contact: patient   Before trying to change the medications I want to be sure that he is taking at with full stomach.  And also it is okay if he can tolerated better by taking it twice daily with breakfast and supper.  Let me know if these things do not work  ----- Message -----  From: Reina Grossman MA  Sent: 3/8/2019   3:55 PM  To: Sharyn Ricks MD        ----- Message -----  From: Crystal Walter RegSched Rep  Sent: 3/8/2019   2:28 PM  To: Reina Grossman MA    Patient stated JANUMET XR  MG tablet is messing with his stomach. Patient just found the letter that was dated 12/12/18.  If patients JANUMET XR  MG tablet  Can be changed please send to       [id number] - [place], [place] - [address] - [phone]  - [phone] (Phone)  [phone] (Fax)    Patient would like a call to let him know if script is going to be changed. Best # for patient [phone].    Patient stated as he was looking at the letter that his testosterone was low and he didn't know if Dr. Ricks was going to do anything about this or if the Vit D has something to do with it.        Left patient a detailed voicemail in regards to Dr. Ricks message.

## 2019-03-13 RX ORDER — SITAGLIPTIN AND METFORMIN HYDROCHLORIDE 1000; 50 MG/1; MG/1
2 TABLET, FILM COATED, EXTENDED RELEASE ORAL DAILY
Qty: 60 TABLET | Refills: 5 | Status: SHIPPED | OUTPATIENT
Start: 2019-03-13 | End: 2019-04-03

## 2019-03-13 RX ORDER — ERTUGLIFLOZIN 15 MG/1
15 TABLET, FILM COATED ORAL EVERY MORNING
Qty: 30 TABLET | Refills: 5 | Status: SHIPPED | OUTPATIENT
Start: 2019-03-13 | End: 2019-11-15

## 2019-03-13 RX ORDER — ERGOCALCIFEROL 1.25 MG/1
50000 CAPSULE ORAL 2 TIMES WEEKLY
Qty: 26 CAPSULE | Refills: 3 | Status: SHIPPED | OUTPATIENT
Start: 2019-03-14 | End: 2019-04-25 | Stop reason: SDUPTHER

## 2019-03-29 ENCOUNTER — RESULTS ENCOUNTER (OUTPATIENT)
Dept: ENDOCRINOLOGY | Age: 34
End: 2019-03-29

## 2019-03-29 DIAGNOSIS — E55.9 VITAMIN D DEFICIENCY: ICD-10-CM

## 2019-03-29 DIAGNOSIS — E78.5 DYSLIPIDEMIA: ICD-10-CM

## 2019-03-29 DIAGNOSIS — E11.9 TYPE 2 DIABETES MELLITUS WITHOUT COMPLICATION, WITHOUT LONG-TERM CURRENT USE OF INSULIN (HCC): Primary | ICD-10-CM

## 2019-03-29 DIAGNOSIS — E11.9 TYPE 2 DIABETES MELLITUS WITHOUT COMPLICATION, WITHOUT LONG-TERM CURRENT USE OF INSULIN (HCC): ICD-10-CM

## 2019-03-29 DIAGNOSIS — R53.82 CHRONIC FATIGUE: ICD-10-CM

## 2019-03-29 DIAGNOSIS — N52.9 ERECTILE DYSFUNCTION, UNSPECIFIED ERECTILE DYSFUNCTION TYPE: ICD-10-CM

## 2019-04-03 RX ORDER — SITAGLIPTIN AND METFORMIN HYDROCHLORIDE 1000; 50 MG/1; MG/1
1 TABLET, FILM COATED ORAL 2 TIMES DAILY WITH MEALS
Qty: 60 TABLET | Refills: 11 | Status: SHIPPED | OUTPATIENT
Start: 2019-04-03 | End: 2019-04-15

## 2019-04-09 ENCOUNTER — LAB (OUTPATIENT)
Dept: ENDOCRINOLOGY | Age: 34
End: 2019-04-09

## 2019-04-09 DIAGNOSIS — E78.5 DYSLIPIDEMIA: ICD-10-CM

## 2019-04-09 DIAGNOSIS — E55.9 VITAMIN D DEFICIENCY: ICD-10-CM

## 2019-04-09 DIAGNOSIS — E11.9 TYPE 2 DIABETES MELLITUS WITHOUT COMPLICATION, WITHOUT LONG-TERM CURRENT USE OF INSULIN (HCC): ICD-10-CM

## 2019-04-10 LAB
25(OH)D3+25(OH)D2 SERPL-MCNC: 26.3 NG/ML (ref 30–100)
ALBUMIN SERPL-MCNC: 5.1 G/DL (ref 3.5–5.2)
ALBUMIN/GLOB SERPL: 2.1 G/DL
ALP SERPL-CCNC: 72 U/L (ref 39–117)
ALT SERPL-CCNC: 18 U/L (ref 1–41)
AST SERPL-CCNC: 16 U/L (ref 1–40)
BILIRUB SERPL-MCNC: 0.7 MG/DL (ref 0.2–1.2)
BUN SERPL-MCNC: 14 MG/DL (ref 6–20)
BUN/CREAT SERPL: 15.6 (ref 7–25)
C PEPTIDE SERPL-MCNC: 0.9 NG/ML (ref 1.1–4.4)
CALCIUM SERPL-MCNC: 10.1 MG/DL (ref 8.6–10.5)
CHLORIDE SERPL-SCNC: 99 MMOL/L (ref 98–107)
CHOLEST SERPL-MCNC: 168 MG/DL (ref 0–200)
CO2 SERPL-SCNC: 24.6 MMOL/L (ref 22–29)
CREAT SERPL-MCNC: 0.9 MG/DL (ref 0.76–1.27)
GLOBULIN SER CALC-MCNC: 2.4 GM/DL
GLUCOSE SERPL-MCNC: 193 MG/DL (ref 65–99)
HBA1C MFR BLD: 8.54 % (ref 4.8–5.6)
HDLC SERPL-MCNC: 53 MG/DL (ref 40–60)
INTERPRETATION: NORMAL
LDLC SERPL CALC-MCNC: 101 MG/DL (ref 0–100)
Lab: NORMAL
POTASSIUM SERPL-SCNC: 4.6 MMOL/L (ref 3.5–5.2)
PROT SERPL-MCNC: 7.5 G/DL (ref 6–8.5)
SODIUM SERPL-SCNC: 140 MMOL/L (ref 136–145)
TRIGL SERPL-MCNC: 68 MG/DL (ref 0–150)
VLDLC SERPL CALC-MCNC: 13.6 MG/DL (ref 5–40)

## 2019-04-15 RX ORDER — ALOGLIPTIN AND METFORMIN HYDROCHLORIDE 12.5; 1 MG/1; MG/1
1 TABLET, FILM COATED ORAL 2 TIMES DAILY
Qty: 60 TABLET | Refills: 5 | Status: SHIPPED | OUTPATIENT
Start: 2019-04-15 | End: 2019-04-25

## 2019-04-25 ENCOUNTER — OFFICE VISIT (OUTPATIENT)
Dept: ENDOCRINOLOGY | Age: 34
End: 2019-04-25

## 2019-04-25 VITALS
HEIGHT: 72 IN | DIASTOLIC BLOOD PRESSURE: 74 MMHG | WEIGHT: 204 LBS | BODY MASS INDEX: 27.63 KG/M2 | SYSTOLIC BLOOD PRESSURE: 124 MMHG

## 2019-04-25 DIAGNOSIS — E11.9 TYPE 2 DIABETES MELLITUS WITHOUT COMPLICATION, WITHOUT LONG-TERM CURRENT USE OF INSULIN (HCC): Primary | ICD-10-CM

## 2019-04-25 DIAGNOSIS — N52.9 ERECTILE DYSFUNCTION, UNSPECIFIED ERECTILE DYSFUNCTION TYPE: ICD-10-CM

## 2019-04-25 DIAGNOSIS — E78.5 DYSLIPIDEMIA: ICD-10-CM

## 2019-04-25 DIAGNOSIS — E78.2 MIXED HYPERLIPIDEMIA: ICD-10-CM

## 2019-04-25 DIAGNOSIS — E55.9 VITAMIN D DEFICIENCY: ICD-10-CM

## 2019-04-25 PROCEDURE — 99214 OFFICE O/P EST MOD 30 MIN: CPT | Performed by: NURSE PRACTITIONER

## 2019-04-25 RX ORDER — ALOGLIPTIN AND METFORMIN HYDROCHLORIDE 12.5; 1 MG/1; MG/1
1 TABLET, FILM COATED ORAL 2 TIMES DAILY
Qty: 60 TABLET | Refills: 5 | Status: SHIPPED | OUTPATIENT
Start: 2019-04-25 | End: 2019-04-25

## 2019-04-25 RX ORDER — SITAGLIPTIN AND METFORMIN HYDROCHLORIDE 1000; 50 MG/1; MG/1
2 TABLET, FILM COATED, EXTENDED RELEASE ORAL DAILY
Qty: 60 TABLET | Refills: 5 | Status: SHIPPED | OUTPATIENT
Start: 2019-04-25 | End: 2019-05-02 | Stop reason: CLARIF

## 2019-04-25 RX ORDER — PIOGLITAZONEHYDROCHLORIDE 30 MG/1
30 TABLET ORAL DAILY
Qty: 30 TABLET | Refills: 5 | Status: SHIPPED | OUTPATIENT
Start: 2019-04-25 | End: 2019-11-15

## 2019-04-25 RX ORDER — ERGOCALCIFEROL 1.25 MG/1
CAPSULE ORAL
Qty: 36 CAPSULE | Refills: 1 | Status: SHIPPED | OUTPATIENT
Start: 2019-04-25

## 2019-04-25 NOTE — PATIENT INSTRUCTIONS
janumet XR 50/1000 2 tabs once daily with food  If blood sugars do not improve let office know  pioglitizone 30 mg once daily  Continue steglatro  Stop glipizide   Bring meter each visit  Sensor to be placed when we have them  Increase vit d to 1 cap 3 times weekly

## 2019-04-25 NOTE — PROGRESS NOTES
"Subjective   Getachew Hammer is a 34 y.o. male is here today for follow-up.  Chief Complaint   Patient presents with   • Diabetes     recent labs, testing BG 2 times daily, pt did not bring meter   • Hyperlipidemia     pt is not taking pioglitazone and janumet   • Vitamin D Deficiency     /74   Ht 182.9 cm (72\")   Wt 92.5 kg (204 lb)   BMI 27.67 kg/m²   Current Outpatient Medications on File Prior to Visit   Medication Sig   • ergocalciferol (DRISDOL) 34896 units capsule Take 1 capsule by mouth 2 (Two) Times a Week.   • glipiZIDE (GLUCOTROL XL) 5 MG ER tablet Take 1 tablet by mouth Daily.   • STEGLATRO 15 MG tablet Take 1 tablet by mouth Every Morning.   • [DISCONTINUED] Alogliptin-metFORMIN HCl 12.5-1000 MG tablet Take 1 tablet by mouth 2 (Two) Times a Day.   • [DISCONTINUED] pioglitazone (ACTOS) 30 MG tablet Take 1 tablet by mouth Daily.     No current facility-administered medications on file prior to visit.      Family History   Problem Relation Age of Onset   • Diabetes Mother    • Hyperlipidemia Mother    • Stroke Mother    • Arthritis Mother    • Hyperlipidemia Father    • Diabetes Maternal Grandmother    • Hyperlipidemia Maternal Grandmother    • Hyperlipidemia Maternal Grandfather    • Hyperlipidemia Paternal Grandmother    • Hyperlipidemia Paternal Grandfather    • Diabetes Sister    • Glaucoma Other      Social History     Tobacco Use   • Smoking status: Current Some Day Smoker     Types: Cigars   • Smokeless tobacco: Never Used   Substance Use Topics   • Alcohol use: Yes     Comment: social   • Drug use: Not on file     No Known Allergies      History of Present Illness  Encounter Diagnoses   Name Primary?   • Vitamin D deficiency    • Type 2 diabetes mellitus without complication, without long-term current use of insulin (CMS/[facility]) Yes   • Dyslipidemia    • Erectile dysfunction, unspecified erectile dysfunction type      34-year-old male patient here today for a follow-up visit.  He has been " seen for the above-mentioned problems.  He has had problems getting Janumet Pioglitizone from his pharmacy and despite trying to utilize coupons for the Janumet he was given the prescriptions.  New prescriptions have been sent and he was given samples along with the new coupons at today's visit.  He is been advised to contact the office so that we can verify if the formulary issue we will change the medication.  His hemoglobin A1c reflects uncontrolled type 2 diabetes.  His C-peptide is low and he has restarted himself on glipizide due to not having the other medications that were prescribed.  He was educated today regarding mechanism of action of his current medications.  He was educated today regarding hemoglobin A1c.  He has not had any hypoglycemic events.  He is taking his vitamin D twice weekly consistently and is still vitamin D deficient.  He does have a family history of high cholesterol and heart disease.  We discussed treating his LDL cholesterol however he wants to wait to see if his diabetes control improves before we add additional medication.  The following portions of the patient's history were reviewed and updated as appropriate: allergies, current medications, past family history, past medical history, past social history, past surgical history and problem list.    Review of Systems   Constitutional: Negative for fatigue.   HENT: Negative for trouble swallowing.    Eyes: Negative for visual disturbance.   Cardiovascular: Negative for leg swelling.   Endocrine: Negative for polyuria.   Skin: Negative for wound.   Neurological: Negative for numbness.       Objective   Physical Exam   Constitutional: He is oriented to person, place, and time. He appears well-developed and well-nourished. No distress.   HENT:   Head: Normocephalic and atraumatic.   Right Ear: External ear normal.   Left Ear: External ear normal.   Nose: Nose normal.   Mouth/Throat: Oropharynx is clear and moist. No oropharyngeal  exudate.   Eyes: Conjunctivae and EOM are normal. Pupils are equal, round, and reactive to light. Right eye exhibits no discharge. Left eye exhibits no discharge. No scleral icterus.   Neck: Normal range of motion. Neck supple. No JVD present. No tracheal deviation present. No thyromegaly present.   Cardiovascular: Normal rate, regular rhythm, normal heart sounds and intact distal pulses. Exam reveals no gallop and no friction rub.   No murmur heard.  Pulmonary/Chest: Effort normal and breath sounds normal. No stridor. No respiratory distress. He has no wheezes. He has no rales. He exhibits no tenderness.   Abdominal: Soft. Bowel sounds are normal. He exhibits no distension and no mass. There is no tenderness. There is no rebound and no guarding. No hernia.   Musculoskeletal: Normal range of motion. He exhibits no edema, tenderness or deformity.   Lymphadenopathy:     He has no cervical adenopathy.   Neurological: He is alert and oriented to person, place, and time. He displays normal reflexes. No cranial nerve deficit or sensory deficit. He exhibits normal muscle tone. Coordination normal.   Skin: Skin is warm and dry. No rash noted. He is not diaphoretic. No erythema. No pallor.   Psychiatric: He has a normal mood and affect. His behavior is normal. Judgment and thought content normal.   Nursing note and vitals reviewed.      Results for orders placed or performed in visit on 04/09/19   C-Peptide   Result Value Ref Range    C-Peptide 0.9 (L) 1.1 - 4.4 ng/mL   Hemoglobin A1c   Result Value Ref Range    Hemoglobin A1C 8.54 (H) 4.80 - 5.60 %   Vitamin D 25 Hydroxy   Result Value Ref Range    25 Hydroxy, Vitamin D 26.3 (L) 30.0 - 100.0 ng/ml   Lipid Panel   Result Value Ref Range    Total Cholesterol 168 0 - 200 mg/dL    Triglycerides 68 0 - 150 mg/dL    HDL Cholesterol 53 40 - 60 mg/dL    VLDL Cholesterol 13.6 5 - 40 mg/dL    LDL Cholesterol  101 (H) 0 - 100 mg/dL   Comprehensive Metabolic Panel   Result Value Ref  Range    Glucose 193 (H) 65 - 99 mg/dL    BUN 14 6 - 20 mg/dL    Creatinine 0.90 0.76 - 1.27 mg/dL    eGFR Non African Am 96 >60 mL/min/1.73    eGFR African Am 116 >60 mL/min/1.73    BUN/Creatinine Ratio 15.6 7.0 - 25.0    Sodium 140 136 - 145 mmol/L    Potassium 4.6 3.5 - 5.2 mmol/L    Chloride 99 98 - 107 mmol/L    Total CO2 24.6 22.0 - 29.0 mmol/L    Calcium 10.1 8.6 - 10.5 mg/dL    Total Protein 7.5 6.0 - 8.5 g/dL    Albumin 5.10 3.50 - 5.20 g/dL    Globulin 2.4 gm/dL    A/G Ratio 2.1 g/dL    Total Bilirubin 0.7 0.2 - 1.2 mg/dL    Alkaline Phosphatase 72 39 - 117 U/L    AST (SGOT) 16 1 - 40 U/L    ALT (SGPT) 18 1 - 41 U/L   Cardiovascular Risk Assessment   Result Value Ref Range    Interpretation Note    Diabetes Patient Education   Result Value Ref Range    PDF Image Not applicable        Assessment/Plan   Problems Addressed this Visit        Digestive    Vitamin D deficiency       Endocrine    Type 2 diabetes mellitus without complication, without long-term current use of insulin (CMS/[facility]) - Primary       Genitourinary    Erectile dysfunction       Other    Dyslipidemia        Summary, patient was seen and examined.  Metabolically he is stable however his A1c is not at goal of less than 7.  He has been unable to get some of his medications that were prescribed by Dr. Ricks.  As result he is restarted himself on Glucotrol.  Based on his recent labs he has been advised to stop Glucotrol and instead try to get Janumet and Pioglitizone that was prescribed for him originally.  He will continue on Steglatro.  He has been advised to bring his blood glucose meter with him to each visit.  I wanted to put a continuous glucose monitoring sensor on him at today's visit however we are out of them and he will stop by next week to have one placed.  Lina the medical assistant was supposed to contact him once we have some available for him to stop by.  He has been advised to contact the office if his blood sugars fail to  improve.

## 2019-08-16 ENCOUNTER — TELEPHONE (OUTPATIENT)
Dept: ENDOCRINOLOGY | Age: 34
End: 2019-08-16

## 2019-08-16 NOTE — TELEPHONE ENCOUNTER
Pt has been informed of samples    ----- Message from Renee Lisa MA sent at 8/15/2019 12:46 PM EDT -----  Contact: PT  PT CALLED ASK IF HE CAN GET SAMPLES OF JANUMET, HE DOES NOT KNOW WHAT MCG HE TAKES. PLEASE ADVISE PT IF HE CAN GET SAMPLES.

## 2019-11-03 LAB
25(OH)D3+25(OH)D2 SERPL-MCNC: 44.4 NG/ML (ref 30–100)
ALBUMIN SERPL-MCNC: 4.9 G/DL (ref 3.5–5.2)
ALBUMIN/GLOB SERPL: 2.2 G/DL
ALP SERPL-CCNC: 73 U/L (ref 39–117)
ALT SERPL-CCNC: 18 U/L (ref 1–41)
AST SERPL-CCNC: 17 U/L (ref 1–40)
BILIRUB SERPL-MCNC: 0.2 MG/DL (ref 0.2–1.2)
BUN SERPL-MCNC: 15 MG/DL (ref 6–20)
BUN/CREAT SERPL: 15.2 (ref 7–25)
C PEPTIDE SERPL-MCNC: 1.9 NG/ML (ref 1.1–4.4)
CALCIUM SERPL-MCNC: 9.5 MG/DL (ref 8.6–10.5)
CHLORIDE SERPL-SCNC: 97 MMOL/L (ref 98–107)
CHOLEST SERPL-MCNC: 160 MG/DL (ref 0–200)
CO2 SERPL-SCNC: 26.6 MMOL/L (ref 22–29)
CREAT SERPL-MCNC: 0.99 MG/DL (ref 0.76–1.27)
GLOBULIN SER CALC-MCNC: 2.2 GM/DL
GLUCOSE SERPL-MCNC: 301 MG/DL (ref 65–99)
HBA1C MFR BLD: 7.6 % (ref 4.8–5.6)
HDLC SERPL-MCNC: 43 MG/DL (ref 40–60)
INTERPRETATION: NORMAL
LDLC SERPL CALC-MCNC: 77 MG/DL (ref 0–100)
Lab: NORMAL
MICROALBUMIN UR-MCNC: <3 UG/ML
POTASSIUM SERPL-SCNC: 4.2 MMOL/L (ref 3.5–5.2)
PROT SERPL-MCNC: 7.1 G/DL (ref 6–8.5)
SHBG SERPL-SCNC: 22.5 NMOL/L (ref 16.5–55.9)
SODIUM SERPL-SCNC: 139 MMOL/L (ref 136–145)
T4 SERPL-MCNC: 7.93 MCG/DL (ref 4.5–11.7)
TESTOST FREE SERPL-MCNC: 10.8 PG/ML (ref 8.7–25.1)
TESTOST SERPL-MCNC: 431 NG/DL (ref 264–916)
TRIGL SERPL-MCNC: 199 MG/DL (ref 0–150)
TSH SERPL DL<=0.005 MIU/L-ACNC: 0.78 UIU/ML (ref 0.27–4.2)
URATE SERPL-MCNC: 5.5 MG/DL (ref 3.4–7)
VLDLC SERPL CALC-MCNC: 39.8 MG/DL

## 2019-11-15 ENCOUNTER — OFFICE VISIT (OUTPATIENT)
Dept: ENDOCRINOLOGY | Age: 34
End: 2019-11-15

## 2019-11-15 VITALS — WEIGHT: 206.8 LBS | BODY MASS INDEX: 28.01 KG/M2 | HEIGHT: 72 IN

## 2019-11-15 DIAGNOSIS — E78.2 MIXED HYPERLIPIDEMIA: ICD-10-CM

## 2019-11-15 DIAGNOSIS — E55.9 VITAMIN D DEFICIENCY: ICD-10-CM

## 2019-11-15 DIAGNOSIS — E11.9 TYPE 2 DIABETES MELLITUS WITHOUT COMPLICATION, WITHOUT LONG-TERM CURRENT USE OF INSULIN (HCC): Primary | ICD-10-CM

## 2019-11-15 DIAGNOSIS — Z71.6 TOBACCO ABUSE COUNSELING: ICD-10-CM

## 2019-11-15 DIAGNOSIS — N52.9 ERECTILE DYSFUNCTION, UNSPECIFIED ERECTILE DYSFUNCTION TYPE: ICD-10-CM

## 2019-11-15 DIAGNOSIS — E78.5 DYSLIPIDEMIA: ICD-10-CM

## 2019-11-15 PROCEDURE — 99214 OFFICE O/P EST MOD 30 MIN: CPT | Performed by: INTERNAL MEDICINE

## 2019-11-15 RX ORDER — TADALAFIL 20 MG/1
20 TABLET ORAL AS NEEDED
Qty: 20 TABLET | Refills: 3 | Status: SHIPPED | OUTPATIENT
Start: 2019-11-15 | End: 2020-11-14

## 2019-11-15 RX ORDER — SITAGLIPTIN 100 MG/1
100 TABLET, FILM COATED ORAL DAILY
Qty: 30 TABLET | Refills: 11 | Status: SHIPPED | OUTPATIENT
Start: 2019-11-15 | End: 2019-11-21

## 2019-11-15 RX ORDER — PIOGLITAZONEHYDROCHLORIDE 30 MG/1
30 TABLET ORAL DAILY
Qty: 90 TABLET | Refills: 3 | Status: SHIPPED | OUTPATIENT
Start: 2019-11-15 | End: 2020-11-14

## 2019-11-15 RX ORDER — ERTUGLIFLOZIN AND METFORMIN HYDROCHLORIDE 7.5; 1 MG/1; MG/1
1 TABLET, FILM COATED ORAL 2 TIMES DAILY
Qty: 60 TABLET | Refills: 11 | Status: SHIPPED | OUTPATIENT
Start: 2019-11-15 | End: 2020-08-17

## 2019-11-15 NOTE — PROGRESS NOTES
"Subjective   Getachew Hammer is a 34 y.o. male here for as a follow up patient for type 2 dm. Lab review. Pt is testing BG at least once a day.     Ht 182.9 cm (72\")   Wt 93.8 kg (206 lb 12.8 oz)   BMI 28.05 kg/m²     No Known Allergies      Current Outpatient Medications:   •  ergocalciferol (DRISDOL) 92892 units capsule, Take 1 cap 3 times daily, Disp: 36 capsule, Rfl: 1  •  STEGLATRO 15 MG tablet, Take 1 tablet by mouth Every Morning., Disp: 30 tablet, Rfl: 5      History of Present Illness this is a 34-year-old gentleman known patient with type 2 diabetes and vitamin D deficiency with erectile dysfunction and mixed dyslipidemia.  Over the course of last 6 months he has had no significant health problem for which to go to the ER or hospital.  He is currently on Steglatro 15 mg every morning and Janumet 50/1000 mg samples once daily because he has not been able to activate the co-pay card.    The following portions of the patient's history were reviewed and updated as appropriate: allergies, current medications, past family history, past medical history, past social history, past surgical history and problem list.    Review of Systems   Constitutional: Negative.    HENT: Negative.    Eyes: Negative.    Respiratory: Negative.    Cardiovascular: Negative.    Gastrointestinal: Negative.    Endocrine: Negative.    Genitourinary: Negative.    Musculoskeletal: Negative.    Skin: Negative.    Allergic/Immunologic: Negative.    Neurological: Negative.    Hematological: Negative.    Psychiatric/Behavioral: Negative.    The above review of system was reviewed, corroborated and accepted.    Objective      Lab Results   Component Value Date    BUN 15 11/01/2019    CREATININE 0.99 11/01/2019    EGFRIFNONA 86 11/01/2019    EGFRIFAFRI 104 11/01/2019    BCR 15.2 11/01/2019    K 4.2 11/01/2019    CO2 26.6 11/01/2019    CALCIUM 9.5 11/01/2019    PROTENTOTREF 7.1 11/01/2019    ALBUMIN 4.90 11/01/2019    LABIL2 2.2 11/01/2019    " AST 17 11/01/2019    ALT 18 11/01/2019       Lab Results   Component Value Date    HGBA1C 7.60 (H) 11/01/2019       Lab Results   Component Value Date    CHLPL 160 11/01/2019    CHLPL 168 04/09/2019    CHLPL 169 12/10/2018     Lab Results   Component Value Date    TRIG 199 (H) 11/01/2019    TRIG 68 04/09/2019    TRIG 60 12/10/2018     Lab Results   Component Value Date    HDL 43 11/01/2019    HDL 53 04/09/2019    HDL 53 12/10/2018     Lab Results   Component Value Date    LDL 77 11/01/2019     (H) 04/09/2019     (H) 12/10/2018       Lab Results   Component Value Date    TSH 0.780 11/01/2019         Physical Exam   Constitutional: He is oriented to person, place, and time. He appears well-developed and well-nourished. No distress.   HENT:   Head: Normocephalic and atraumatic.   Right Ear: External ear normal.   Left Ear: External ear normal.   Nose: Nose normal.   Mouth/Throat: Oropharynx is clear and moist. No oropharyngeal exudate.   Eyes: Conjunctivae and EOM are normal. Pupils are equal, round, and reactive to light. Right eye exhibits no discharge. Left eye exhibits no discharge. No scleral icterus.   Neck: Normal range of motion. Neck supple. No JVD present. No tracheal deviation present. No thyromegaly present.   Cardiovascular: Normal rate, regular rhythm, normal heart sounds and intact distal pulses. Exam reveals no gallop and no friction rub.   No murmur heard.  Pulmonary/Chest: Effort normal and breath sounds normal. No stridor. No respiratory distress. He has no wheezes. He has no rales. He exhibits no tenderness.   Abdominal: Soft. Bowel sounds are normal. He exhibits no distension and no mass. There is no tenderness. There is no rebound and no guarding. No hernia.   Musculoskeletal: Normal range of motion. He exhibits no edema, tenderness or deformity.   Lymphadenopathy:     He has no cervical adenopathy.   Neurological: He is alert and oriented to person, place, and time. He displays  normal reflexes. No cranial nerve deficit or sensory deficit. He exhibits normal muscle tone. Coordination normal.   Skin: Skin is warm and dry. No rash noted. He is not diaphoretic. No erythema. No pallor.   Psychiatric: He has a normal mood and affect. His behavior is normal. Judgment and thought content normal.   Nursing note and vitals reviewed.  No significant change since 12/10/2018 office visit.    Assessment/Plan   Diagnoses and all orders for this visit:    Type 2 diabetes mellitus without complication, without long-term current use of insulin (CMS/[facility])  -     SEGLUROMET 7.5-1000 MG tablet; Take 1 tablet by mouth 2 (Two) Times a Day.  -     JANUVIA 100 MG tablet; Take 1 tablet by mouth Daily.  -     pioglitazone (ACTOS) 30 MG tablet; Take 1 tablet by mouth Daily.  -     T4 & TSH ([facility]); Future  -     Uric Acid; Future  -     Vitamin D 25 Hydroxy; Future  -     Comprehensive Metabolic Panel; Future  -     C-Peptide; Future  -     Hemoglobin A1c; Future  -     MicroAlbumin, Urine, Random - Urine, Clean Catch; Future  -     NMR LipoProfile; Future    Mixed hyperlipidemia  -     SEGLUROMET 7.5-1000 MG tablet; Take 1 tablet by mouth 2 (Two) Times a Day.  -     JANUVIA 100 MG tablet; Take 1 tablet by mouth Daily.  -     pioglitazone (ACTOS) 30 MG tablet; Take 1 tablet by mouth Daily.  -     T4 & TSH ([facility]); Future  -     Uric Acid; Future  -     Vitamin D 25 Hydroxy; Future  -     Comprehensive Metabolic Panel; Future  -     C-Peptide; Future  -     Hemoglobin A1c; Future  -     MicroAlbumin, Urine, Random - Urine, Clean Catch; Future  -     NMR LipoProfile; Future    Vitamin D deficiency  -     SEGLUROMET 7.5-1000 MG tablet; Take 1 tablet by mouth 2 (Two) Times a Day.  -     JANUVIA 100 MG tablet; Take 1 tablet by mouth Daily.  -     pioglitazone (ACTOS) 30 MG tablet; Take 1 tablet by mouth Daily.  -     T4 & TSH ([facility]); Future  -     Uric Acid; Future  -     Vitamin D 25 Hydroxy; Future  -      Comprehensive Metabolic Panel; Future  -     C-Peptide; Future  -     Hemoglobin A1c; Future  -     MicroAlbumin, Urine, Random - Urine, Clean Catch; Future  -     NMR LipoProfile; Future    Erectile dysfunction, unspecified erectile dysfunction type  -     SEGLUROMET 7.5-1000 MG tablet; Take 1 tablet by mouth 2 (Two) Times a Day.  -     JANUVIA 100 MG tablet; Take 1 tablet by mouth Daily.  -     pioglitazone (ACTOS) 30 MG tablet; Take 1 tablet by mouth Daily.  -     T4 & TSH ([facility]); Future  -     Uric Acid; Future  -     Vitamin D 25 Hydroxy; Future  -     Comprehensive Metabolic Panel; Future  -     C-Peptide; Future  -     Hemoglobin A1c; Future  -     MicroAlbumin, Urine, Random - Urine, Clean Catch; Future  -     NMR LipoProfile; Future    Dyslipidemia  -     SEGLUROMET 7.5-1000 MG tablet; Take 1 tablet by mouth 2 (Two) Times a Day.  -     JANUVIA 100 MG tablet; Take 1 tablet by mouth Daily.  -     pioglitazone (ACTOS) 30 MG tablet; Take 1 tablet by mouth Daily.  -     T4 & TSH ([facility]); Future  -     Uric Acid; Future  -     Vitamin D 25 Hydroxy; Future  -     Comprehensive Metabolic Panel; Future  -     C-Peptide; Future  -     Hemoglobin A1c; Future  -     MicroAlbumin, Urine, Random - Urine, Clean Catch; Future  -     NMR LipoProfile; Future    Tobacco abuse counseling  -     SEGLUROMET 7.5-1000 MG tablet; Take 1 tablet by mouth 2 (Two) Times a Day.  -     JANUVIA 100 MG tablet; Take 1 tablet by mouth Daily.  -     pioglitazone (ACTOS) 30 MG tablet; Take 1 tablet by mouth Daily.  -     T4 & TSH ([facility]); Future  -     Uric Acid; Future  -     Vitamin D 25 Hydroxy; Future  -     Comprehensive Metabolic Panel; Future  -     C-Peptide; Future  -     Hemoglobin A1c; Future  -     MicroAlbumin, Urine, Random - Urine, Clean Catch; Future  -     NMR LipoProfile; Future    Other orders  -     tadalafil (CIALIS) 20 MG tablet; Take 1 tablet by mouth As Needed for Erectile Dysfunction.      In summary I saw and  examined this 34-year-old female for above-mentioned problems.  I reviewed his laboratory evaluation of 11/1/2019 and provided him with a hard copy of it.  His A1c 7.6 and otherwise he is metabolically and clinically stable and therefore I am going to go ahead and give him Segluromet 7.5/1000 mg 2 tablets every morning and Januvia 100 mg daily.  I asked him to resume his pioglitazone 30 mg daily.  I also discussed with him the unsavory consequences of continued smoking and he promised to seriously think about stopping altogether.  He will see Ms. Ragini Shah in 6 months or sooner if needed but laboratory evaluation prior to each office visit.

## 2019-11-21 RX ORDER — LINAGLIPTIN 5 MG/1
5 TABLET, FILM COATED ORAL DAILY
Qty: 30 TABLET | Refills: 11 | Status: SHIPPED | OUTPATIENT
Start: 2019-11-21 | End: 2020-08-17

## 2020-05-01 ENCOUNTER — RESULTS ENCOUNTER (OUTPATIENT)
Dept: ENDOCRINOLOGY | Age: 35
End: 2020-05-01

## 2020-05-01 DIAGNOSIS — Z71.6 TOBACCO ABUSE COUNSELING: ICD-10-CM

## 2020-05-01 DIAGNOSIS — N52.9 ERECTILE DYSFUNCTION, UNSPECIFIED ERECTILE DYSFUNCTION TYPE: ICD-10-CM

## 2020-05-01 DIAGNOSIS — E55.9 VITAMIN D DEFICIENCY: ICD-10-CM

## 2020-05-01 DIAGNOSIS — E11.9 TYPE 2 DIABETES MELLITUS WITHOUT COMPLICATION, WITHOUT LONG-TERM CURRENT USE OF INSULIN (HCC): ICD-10-CM

## 2020-05-01 DIAGNOSIS — E78.5 DYSLIPIDEMIA: ICD-10-CM

## 2020-05-01 DIAGNOSIS — E78.2 MIXED HYPERLIPIDEMIA: ICD-10-CM

## 2020-06-15 RX ORDER — LINACLOTIDE 145 UG/1
145 CAPSULE, GELATIN COATED ORAL
Qty: 30 CAPSULE | Refills: 11 | Status: SHIPPED | OUTPATIENT
Start: 2020-06-15

## 2020-06-16 ENCOUNTER — TELEPHONE (OUTPATIENT)
Dept: ENDOCRINOLOGY | Age: 35
End: 2020-06-16

## 2020-06-16 NOTE — TELEPHONE ENCOUNTER
----- Message from Sharyn Ricks MD sent at 6/15/2020  4:35 PM EDT -----  I sent some Linzess 145 mg daily to the pharmacy  ----- Message -----  From: Reina Grossman MA  Sent: 6/15/2020   2:44 PM EDT  To: Sharyn Ricks MD    No having a bowel movement and wants a laxative sent to the pharmacy. He states that his stomach is hurting and he has tried over the counter medications but they are not working.

## 2020-07-21 ENCOUNTER — TELEPHONE (OUTPATIENT)
Dept: ENDOCRINOLOGY | Age: 35
End: 2020-07-21

## 2020-08-17 ENCOUNTER — TELEPHONE (OUTPATIENT)
Dept: ENDOCRINOLOGY | Age: 35
End: 2020-08-17

## 2020-08-17 RX ORDER — SITAGLIPTIN AND METFORMIN HYDROCHLORIDE 1000; 50 MG/1; MG/1
TABLET, FILM COATED ORAL
Qty: 60 TABLET | Refills: 0 | Status: SHIPPED | OUTPATIENT
Start: 2020-08-17 | End: 2020-08-17

## 2020-08-17 RX ORDER — SITAGLIPTIN AND METFORMIN HYDROCHLORIDE 1000; 50 MG/1; MG/1
2 TABLET, FILM COATED, EXTENDED RELEASE ORAL DAILY
Qty: 180 TABLET | Refills: 3 | Status: SHIPPED | OUTPATIENT
Start: 2020-08-17

## 2020-08-17 RX ORDER — ERTUGLIFLOZIN 15 MG/1
15 TABLET, FILM COATED ORAL EVERY MORNING
Qty: 90 TABLET | Refills: 3 | Status: SHIPPED | OUTPATIENT
Start: 2020-08-17

## 2020-08-17 RX ORDER — SITAGLIPTIN AND METFORMIN HYDROCHLORIDE 1000; 50 MG/1; MG/1
TABLET, FILM COATED, EXTENDED RELEASE ORAL
Qty: 60 TABLET | Refills: 0 | Status: SHIPPED | OUTPATIENT
Start: 2020-08-17 | End: 2020-08-17

## 2020-08-17 RX ORDER — ALOGLIPTIN AND METFORMIN HYDROCHLORIDE 12.5; 1 MG/1; MG/1
TABLET, FILM COATED ORAL
Qty: 60 TABLET | Refills: 0 | OUTPATIENT
Start: 2020-08-17

## 2020-08-17 NOTE — TELEPHONE ENCOUNTER
Gregor with the pharmacy left a v/m  [phone]  Said he got two prescription today  janumet   janumet xr    Needs clarification

## 2021-01-06 DIAGNOSIS — E55.9 VITAMIN D DEFICIENCY: ICD-10-CM

## 2021-01-06 DIAGNOSIS — E78.2 MIXED HYPERLIPIDEMIA: ICD-10-CM

## 2021-01-06 DIAGNOSIS — Z71.6 TOBACCO ABUSE COUNSELING: ICD-10-CM

## 2021-01-06 DIAGNOSIS — E11.9 TYPE 2 DIABETES MELLITUS WITHOUT COMPLICATION, WITHOUT LONG-TERM CURRENT USE OF INSULIN (HCC): ICD-10-CM

## 2021-01-06 DIAGNOSIS — E78.5 DYSLIPIDEMIA: Primary | ICD-10-CM

## 2021-04-15 ENCOUNTER — OFFICE (AMBULATORY)
Dept: URBAN - METROPOLITAN AREA CLINIC 75 | Facility: CLINIC | Age: 36
End: 2021-04-15

## 2021-04-15 VITALS
SYSTOLIC BLOOD PRESSURE: 115 MMHG | HEIGHT: 72 IN | DIASTOLIC BLOOD PRESSURE: 78 MMHG | TEMPERATURE: 96.8 F | WEIGHT: 186 LBS

## 2021-04-15 DIAGNOSIS — R11.2 NAUSEA WITH VOMITING, UNSPECIFIED: ICD-10-CM

## 2021-04-15 DIAGNOSIS — K59.00 CONSTIPATION, UNSPECIFIED: ICD-10-CM

## 2021-04-15 DIAGNOSIS — R10.13 EPIGASTRIC PAIN: ICD-10-CM

## 2021-04-15 DIAGNOSIS — R63.4 ABNORMAL WEIGHT LOSS: ICD-10-CM

## 2021-04-15 PROCEDURE — 99244 OFF/OP CNSLTJ NEW/EST MOD 40: CPT | Performed by: INTERNAL MEDICINE

## 2021-06-10 ENCOUNTER — AMBULATORY SURGICAL CENTER (AMBULATORY)
Dept: URBAN - METROPOLITAN AREA SURGERY 17 | Facility: SURGERY | Age: 36
End: 2021-06-10

## 2021-06-10 ENCOUNTER — OFFICE (AMBULATORY)
Dept: URBAN - METROPOLITAN AREA PATHOLOGY 4 | Facility: PATHOLOGY | Age: 36
End: 2021-06-10
Payer: COMMERCIAL

## 2021-06-10 VITALS
RESPIRATION RATE: 23 BRPM | HEART RATE: 83 BPM | HEART RATE: 87 BPM | RESPIRATION RATE: 16 BRPM | RESPIRATION RATE: 20 BRPM | HEIGHT: 72 IN | RESPIRATION RATE: 22 BRPM | SYSTOLIC BLOOD PRESSURE: 133 MMHG | TEMPERATURE: 98.1 F | OXYGEN SATURATION: 97 % | DIASTOLIC BLOOD PRESSURE: 76 MMHG | SYSTOLIC BLOOD PRESSURE: 109 MMHG | OXYGEN SATURATION: 95 % | SYSTOLIC BLOOD PRESSURE: 104 MMHG | RESPIRATION RATE: 13 BRPM | WEIGHT: 170 LBS | DIASTOLIC BLOOD PRESSURE: 74 MMHG | HEART RATE: 85 BPM | DIASTOLIC BLOOD PRESSURE: 73 MMHG | SYSTOLIC BLOOD PRESSURE: 125 MMHG | HEART RATE: 74 BPM | SYSTOLIC BLOOD PRESSURE: 102 MMHG | OXYGEN SATURATION: 100 % | HEART RATE: 98 BPM | SYSTOLIC BLOOD PRESSURE: 130 MMHG | SYSTOLIC BLOOD PRESSURE: 110 MMHG | HEART RATE: 82 BPM | HEART RATE: 92 BPM | HEART RATE: 81 BPM | OXYGEN SATURATION: 99 % | OXYGEN SATURATION: 98 % | DIASTOLIC BLOOD PRESSURE: 65 MMHG | DIASTOLIC BLOOD PRESSURE: 71 MMHG | HEART RATE: 93 BPM | RESPIRATION RATE: 24 BRPM | DIASTOLIC BLOOD PRESSURE: 79 MMHG | TEMPERATURE: 97.5 F | SYSTOLIC BLOOD PRESSURE: 121 MMHG | SYSTOLIC BLOOD PRESSURE: 115 MMHG | DIASTOLIC BLOOD PRESSURE: 84 MMHG | DIASTOLIC BLOOD PRESSURE: 77 MMHG | RESPIRATION RATE: 18 BRPM | RESPIRATION RATE: 11 BRPM | SYSTOLIC BLOOD PRESSURE: 127 MMHG

## 2021-06-10 DIAGNOSIS — R19.4 CHANGE IN BOWEL HABIT: ICD-10-CM

## 2021-06-10 DIAGNOSIS — K31.89 OTHER DISEASES OF STOMACH AND DUODENUM: ICD-10-CM

## 2021-06-10 DIAGNOSIS — K25.9 GASTRIC ULCER, UNSPECIFIED AS ACUTE OR CHRONIC, WITHOUT HEMO: ICD-10-CM

## 2021-06-10 DIAGNOSIS — K64.8 OTHER HEMORRHOIDS: ICD-10-CM

## 2021-06-10 DIAGNOSIS — R11.2 NAUSEA WITH VOMITING, UNSPECIFIED: ICD-10-CM

## 2021-06-10 DIAGNOSIS — R10.13 EPIGASTRIC PAIN: ICD-10-CM

## 2021-06-10 DIAGNOSIS — K57.30 DIVERTICULOSIS OF LARGE INTESTINE WITHOUT PERFORATION OR ABS: ICD-10-CM

## 2021-06-10 DIAGNOSIS — K64.4 RESIDUAL HEMORRHOIDAL SKIN TAGS: ICD-10-CM

## 2021-06-10 DIAGNOSIS — R63.4 ABNORMAL WEIGHT LOSS: ICD-10-CM

## 2021-06-10 LAB
GI HISTOLOGY: A. SELECT: (no result)
GI HISTOLOGY: B. SELECT: (no result)
GI HISTOLOGY: PDF REPORT: (no result)

## 2021-06-10 PROCEDURE — 45378 DIAGNOSTIC COLONOSCOPY: CPT | Performed by: INTERNAL MEDICINE

## 2021-06-10 PROCEDURE — 43239 EGD BIOPSY SINGLE/MULTIPLE: CPT | Performed by: INTERNAL MEDICINE

## 2021-06-10 PROCEDURE — 88342 IMHCHEM/IMCYTCHM 1ST ANTB: CPT | Performed by: INTERNAL MEDICINE

## 2021-06-10 PROCEDURE — 88305 TISSUE EXAM BY PATHOLOGIST: CPT | Performed by: INTERNAL MEDICINE

## 2021-06-10 RX ORDER — AMITRIPTYLINE HYDROCHLORIDE 10 MG/1
TABLET, FILM COATED ORAL
Qty: 60 | Refills: 1 | Status: ACTIVE

## 2021-06-10 NOTE — SERVICEHPINOTES
Thank you very much for referring Mister Mcmanus for evaluation. As you know he is a pleasant 36-year-old gentleman who does have a history of type 2 diabetes. He is here today because for the past year or more he's been having nausea in the morning, he'll wake up with nausea and abdominal pain. He occasionally has associated bilious emesis. He'll fill ill for a couple of hours and then be fine the rest of the day. He says in the mornings this happens 5 or 6 out of 7 days a week. He does use marijuana daily and has done so for years. He says he feels better after taking hot showers. 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 He did have a cholecystectomy in 2009. He is also a type II diabetic. He says his blood sugars run around 200 and apparently he's had gastric emptying study in the past. The study was unremarkable. He also reports some weight loss.He also says he's had a change in bowel habits over the past couple of years. He tends to be constipated. He says he is gradually lost about 20 pounds. Thinks that's because he doesn't eat first thing in the morning because of his upper GI symptoms. He says he'll sometimes eat bananas in the morning because he seems to tolerate that. There is no lower abdominal pain. There is no blood in the bowels. Family history is negative for polyps colitis colon cancer any upper GI issues. He says he has not had any blood work. He had a CT scan of the chest, abdomen and pelvis done a year ago which was unremarkable other than fatty liver. He is in no distress. He does not look acutely ill. He is a social drinker. He does not use tobacco products.

## 2021-10-18 ENCOUNTER — OFFICE (AMBULATORY)
Dept: URBAN - METROPOLITAN AREA CLINIC 75 | Facility: CLINIC | Age: 36
End: 2021-10-18

## 2021-10-18 VITALS
DIASTOLIC BLOOD PRESSURE: 70 MMHG | OXYGEN SATURATION: 93 % | SYSTOLIC BLOOD PRESSURE: 118 MMHG | HEIGHT: 72 IN | HEART RATE: 97 BPM | WEIGHT: 184 LBS

## 2021-10-18 DIAGNOSIS — R15.2 FECAL URGENCY: ICD-10-CM

## 2021-10-18 PROBLEM — K31.89 OTHER DISEASES OF STOMACH AND DUODENUM: Status: ACTIVE | Noted: 2021-06-10

## 2021-10-18 PROBLEM — K57.30 DVRTCLOS OF LG INT W/O PERFORATION OR ABSCESS W/O BLEEDING: Status: ACTIVE | Noted: 2021-06-10

## 2021-10-18 PROCEDURE — 99213 OFFICE O/P EST LOW 20 MIN: CPT | Performed by: INTERNAL MEDICINE

## 2022-02-23 ENCOUNTER — TELEPHONE (OUTPATIENT)
Dept: ENDOCRINOLOGY | Age: 37
End: 2022-02-23

## 2022-02-23 RX ORDER — SITAGLIPTIN AND METFORMIN HYDROCHLORIDE 1000; 50 MG/1; MG/1
2 TABLET, FILM COATED, EXTENDED RELEASE ORAL DAILY
Qty: 180 TABLET | Refills: 3 | OUTPATIENT
Start: 2022-02-23

## 2024-04-15 ENCOUNTER — TRANSCRIBE ORDERS (OUTPATIENT)
Dept: PHYSICAL THERAPY | Facility: CLINIC | Age: 39
End: 2024-04-15
Payer: COMMERCIAL

## 2024-04-15 DIAGNOSIS — S83.92XA SPRAIN OF LEFT KNEE, UNSPECIFIED LIGAMENT, INITIAL ENCOUNTER: Primary | ICD-10-CM

## 2024-04-24 ENCOUNTER — TREATMENT (OUTPATIENT)
Dept: PHYSICAL THERAPY | Facility: CLINIC | Age: 39
End: 2024-04-24
Payer: OTHER MISCELLANEOUS

## 2024-04-24 DIAGNOSIS — S83.92XD SPRAIN OF LEFT KNEE, UNSPECIFIED LIGAMENT, SUBSEQUENT ENCOUNTER: Primary | ICD-10-CM

## 2024-04-24 DIAGNOSIS — R26.2 DIFFICULTY WALKING: ICD-10-CM

## 2024-04-24 PROCEDURE — 97110 THERAPEUTIC EXERCISES: CPT | Performed by: PHYSICAL THERAPIST

## 2024-04-24 PROCEDURE — 97161 PT EVAL LOW COMPLEX 20 MIN: CPT | Performed by: PHYSICAL THERAPIST

## 2024-04-24 NOTE — PROGRESS NOTES
[facility]  [place], [place]    Initial Evaluation and Plan of Care      Patient: Getachew Hammer   : 1985  Diagnosis/ICD-10 Code:  Sprain of left knee, unspecified ligament, subsequent encounter [S83.92XD]  Referring practitioner: FILI Jordan  Date of Initial Visit: 2024  Today's Date: 2024  Patient seen for Visit count could not be calculated. Make sure you are using a visit which is associated with an episode. session         Visit Diagnoses:    ICD-10-CM ICD-9-CM   1. Sprain of left knee, unspecified ligament, subsequent encounter  S83.92XD V58.89     844.9   2. Difficulty walking  R26.2 719.7         Subjective Questionnaire: Oxford Knee Score:       Subjective Evaluation    History of Present Illness  Mechanism of injury: 39 year old injured at work mid March going down steps while carrying tools. R leg extended and L knee flexed behind him. Stood up on his own. Went to  same day. Light duty continues. X-rays negative. Simple knee sleeve given. No meds. Ibuprofen recommended.  No prior injury. Bball in high school and no injury. Still plays but has not since injury.   Follow up on 4-15-24 with OM reported pain and weakness with lateral motion so referred to PT.   Pt in good shape at 190 and states his diabetes is now under control and not on meds. Stopped smoking.   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      Patient Occupation:    Precautions and Work Restrictions: light dutyQuality of life: excellent    Pain  Current pain ratin  At best pain rating: 3  At worst pain ratin  Location: med and lateral  Quality: dull ache and discomfort  Relieving factors: change in position  Aggravating factors: lifting, movement and stairs  Progression: improved    Social Support  Lives in: one-story house  Lives with: young children    Diagnostic Tests  X-ray: normal    Treatments  No previous or current treatments  Patient  "Goals  Patient goals for therapy: increased strength, return to sport/leisure activities and return to work             Objective          Static Posture     Ankle/Foot   Ankle/Foot (Left): Pes cavus.   Ankle/Foot (Right): Pes cavus.     Tenderness   Left Knee   Tenderness in the lateral joint line and medial joint line.     Active Range of Motion   Left Knee   Flexion: 126 degrees with pain  Extension: WFL    Right Knee   Flexion: 143 degrees   Extension: with pain    Strength/Myotome Testing     Left Knee   Flexion: 4-  Extension: 4    Right Knee   Normal strength    Tests     Left Knee   Positive Thessaly's test at 5 degrees.   Negative anterior drawer, anterior Lachman, valgus stress test at 0 degrees, valgus stress test at 30 degrees, varus stress test at 0 degrees and varus stress test at 30 degrees.     Right Knee   Negative Thessaly's test at 5 degrees.     Left Knee Flexibility Comments:   Good hamstring length    Right Knee Flexibility Comments:   Good hamstring length    Swelling     Left Knee Girth Measurement (cm)   Joint line: 38 cm    Right Knee Girth Measurement (cm)   Joint line: 37 cm    Ambulation   Weight-Bearing Status   Assistive device used: none    Ambulation: Level Surfaces   Ambulation without assistive device: independent    Ambulation: Stairs   Ascend stairs: independent  Pattern: reciprocal  Railings: without rails  Descend stairs: independent  Pattern: reciprocal  Railings: without rails    Observational Gait   Gait: within functional limits   Decreased walking speed and stride length.   Left foot contact pattern: heel to toe  Right foot contact pattern: heel to toe    Functional Assessment     Forward Step Up 6\"   Left Leg  Increased contralateral push off.     Forward Step Down 6\"   Left Leg  Preload.           Assessment & Plan       Assessment  Impairments: abnormal gait, abnormal muscle firing, abnormal or restricted ROM, activity intolerance, impaired balance, impaired physical " strength, lacks appropriate home exercise program, pain with function, safety issue and weight-bearing intolerance   Functional limitations: carrying objects, lifting, walking, pulling, pushing and uncomfortable because of pain   Assessment details: Pt with stable condition.   States his diabetes is under control and no longer needing meds. Denies high or low readings and has not had any issues when playing basketball or at work so should be safe for ex in PT.   Presents with hyperflexion sprain about 5 weeks post injury at this time.   States improvement in ROM and power but feels about 75-80% which correlates with testing today.   Pt did well with 4 way SLR, biking, and leg press today. Did have pain with Thessally's test thus can not rule out meniscal irritation at this time.   No co-morbidity of prior injury. Personal factor of work demands that can increase his pain, but luckily has 2 subordinates that he can delegate that work to.   Skilled PT needed to help pt return to full duties and also return to basketball which has been good for his general health and to keep his blood sugar levels in good ranges.   Prognosis: good  Prognosis details: Access Code: [email or web address]  URL: [email or web address]  Date: 04/24/2024  Prepared by: Zorre Kimura    Exercises  - Supine Quad Set  - 1 x daily - 7 x weekly - 10 reps - hold 5sec. don't lift foot hold  - Active Straight Leg Raise with Quad Set  - 1 x daily - 7 x weekly - 2 sets - 10 reps  - Sidelying Hip Abduction  - 1 x daily - 7 x weekly - 2 sets - 10 reps  - Sidelying Hip Adduction with Ankle Weight - Leg Behind  - 1 x daily - 7 x weekly - 2 sets - 10 reps  - Prone Hip Extension  - 1 x daily - 7 x weekly - 2 sets - 10 reps  - Prone Terminal Knee Extension  - 1 x daily - 7 x weekly - 10 reps  - Standing Terminal Knee Extension with Resistance  - 1 x daily - 7 x weekly - 2 sets - 10 reps  - Forward T  - 1 x daily - 7 x weekly - 10 reps  - Standing Marching  - 1 x daily  - 7 x weekly - 10 reps  - Single Leg Stance  - 1 x daily - 7 x weekly - 2 sets - 15s hold    Goals  Plan Goals: STGs 2-4 weeks:  1. Improve [place] knee score from 30/48 to > 40/48  2. Improve L knee flexion from 126 to 140  3. Improve strength L knee from 4- to 4/5 lifting light to moderate weights  4. Pt to state no issue managing steps   5. Pt to exhibit good lateral stability with no pain and be released for full work duties  LTGs 8-12 weeks:  1. Oxford score > 44/48  2. 4+ strength L knee lifting moderate to heavy weights  3. Pt to state no pain or instability with closed chain activities thus can return to basketball  4. Negative Thessally's test    Plan  Therapy options: will be seen for skilled therapy services  Planned modality interventions: cryotherapy  Planned therapy interventions: manual therapy, neuromuscular re-education, strengthening, stretching, therapeutic activities, home exercise program, functional ROM exercises and balance/weight-bearing training  Frequency: 2x week  Duration in weeks: 12  Treatment plan discussed with: patient        History # of Personal Factors and/or Comorbidities: LOW (0)  Examination of Body System(s): # of elements: LOW (1-2)  Clinical Presentation: STABLE   Clinical Decision Making: LOW       Timed:         Manual Therapy:         mins  55130;     Therapeutic Exercise:    30     mins  94377;     Neuromuscular Natanael:        mins  20781;    Therapeutic Activity:          mins  88336;     Gait Training:           mins  66248;     Ultrasound:          mins  83962;    Ionto                                   mins   28831  Self Care                            mins   04726  Canalith Repos         mins 41379  Aquatic Exercise                 mins 50911    Un-Timed:  Electrical Stimulation:         mins  31342 ( );  Dry Needling          mins self-pay  Traction          mins 10580    Low Eval     20     Mins  70887  Mod Eval          Mins  06440  High Eval                             Mins  29968        Timed Treatment:   30   mins   Total Treatment:     50   mins          PT: Zorre Zeno Kimura, PT     [place] License Number: [id number]  [place] License Number:  [id number]  Electronically signed by Zorre Zeno Kimura, PT, 04/24/24, 1:20 PM EDT    Certification Period: 4/24/2024 thru 7/22/2024  I certify that the therapy services are furnished while this patient is under my care.  The services outlined above are required by this patient, and will be reviewed every 90 days.         Physician Signature:__________________________________________________    PHYSICIAN: Analia Lebron PA  NPI: [id number]                                      DATE:      Please sign and return via fax to [phone]  Thank you, [facility].

## 2024-04-30 ENCOUNTER — TREATMENT (OUTPATIENT)
Dept: PHYSICAL THERAPY | Facility: CLINIC | Age: 39
End: 2024-04-30
Payer: OTHER MISCELLANEOUS

## 2024-04-30 DIAGNOSIS — S83.92XD SPRAIN OF LEFT KNEE, UNSPECIFIED LIGAMENT, SUBSEQUENT ENCOUNTER: Primary | ICD-10-CM

## 2024-04-30 DIAGNOSIS — R26.2 DIFFICULTY WALKING: ICD-10-CM

## 2024-04-30 PROCEDURE — 97530 THERAPEUTIC ACTIVITIES: CPT | Performed by: PHYSICAL THERAPIST

## 2024-04-30 PROCEDURE — 97112 NEUROMUSCULAR REEDUCATION: CPT | Performed by: PHYSICAL THERAPIST

## 2024-04-30 PROCEDURE — 97110 THERAPEUTIC EXERCISES: CPT | Performed by: PHYSICAL THERAPIST

## 2024-04-30 NOTE — PROGRESS NOTES
Physical Therapy Daily Treatment Note      [facility] [place]              [address], [address]                [place]  [address]        Patient: Getachew Hammer   : 1985  Diagnosis/ICD-10 Code:  Sprain of left knee, unspecified ligament, subsequent encounter [S83.92XD]  Referring practitioner: FILI Jordan  Date of Initial Visit: Type: THERAPY  Noted: 2024  Today's Date: 2024  Patient seen for 1 sessions         Subjective    Getachew Hammer reports: his knee isn't too bad.  He said it is about a 3/10 when he is active but is fine if he is resting.               Objective   See Exercise, Manual, and Modality Logs for complete treatment.       Assessment/Plan  Progressed exercises as noted with no increased knee pain however muscle fatigue reported.  He required intermittent cueing for technique and was able to return demonstrate improvements.  Will monitor tolerance to exercises and continue to progress towards improved function and decreased pain with home and work tasks.     Progress per Plan of Care           Timed:  Manual Therapy:         mins  14472;  Therapeutic Exercise:    15     mins  69810;     Neuromuscular Natanael:    8    mins  98555;    Therapeutic Activity:     12     mins  81476;     Gait Training:           mins  46206;     Ultrasound:          mins  92501;     Work Conditioning/Hardening (initial 2 hours)        mins  25255  Work Conditioning/Hardening (each add'l hour)        mins  36462    Untimed:   Electrical Stimulation:         mins  40394 ( );  Traction          mins 91072    Timed Treatment:   35   mins   Total Treatment:     35   mins    Mayelin Reynoso PTA  Physical Therapist Assistant

## 2024-05-03 ENCOUNTER — TREATMENT (OUTPATIENT)
Dept: PHYSICAL THERAPY | Facility: CLINIC | Age: 39
End: 2024-05-03
Payer: OTHER MISCELLANEOUS

## 2024-05-03 DIAGNOSIS — R26.2 DIFFICULTY WALKING: ICD-10-CM

## 2024-05-03 DIAGNOSIS — S83.92XD SPRAIN OF LEFT KNEE, UNSPECIFIED LIGAMENT, SUBSEQUENT ENCOUNTER: Primary | ICD-10-CM

## 2024-05-03 NOTE — PROGRESS NOTES
"Physical Therapy Daily Treatment Note    Patient: Getachew Hammer  : 1985  Referring practitioner: FILI Jordan  Date of Initial Visit: Type: THERAPY  Noted: 2024  Today's Date: 5/3/2024  Patient seen for 2 sessions      Visit Diagnoses:    ICD-10-CM ICD-9-CM   1. Sprain of left knee, unspecified ligament, subsequent encounter  S83.92XD V58.89     844.9   2. Difficulty walking  R26.2 719.7       VISIT#: 2    Subjective   Getachew Hammer reports his knee is \"not too bad\" today.      Objective     See Exercise, Manual, and Modality Logs for complete treatment.       Assessment/Plan    Added lateral lunges onto bosu and standing hip ext. Pt was able to complete all exercises without c/o pain. Did report some popping with slight knee flexion in standing. Plan to progress strengthening next session.    Progress per Plan of Care         Timed:         Manual Therapy:         mins  32753;     Therapeutic Exercise:    45     mins  36328;     Neuromuscular Natanael:        mins  42328;    Therapeutic Activity:          mins  54753;     Gait Training:           mins  86699;     Ultrasound:          mins  54969;    Ionto                                   mins   96336  Self Care                            mins   69056    Un-Timed:  Electrical Stimulation:         mins  13438 ( );  Traction          mins 23180  Re-Eval                               mins  15095    Timed Treatment:   45   mins   Total Treatment:     55   mins        Elvie Vaughan PT  Physical Therapist  [place] License #: [id number]  [place] License #: [id number]                       "

## 2024-05-07 ENCOUNTER — TELEPHONE (OUTPATIENT)
Dept: PHYSICAL THERAPY | Facility: CLINIC | Age: 39
End: 2024-05-07

## 2024-05-08 ENCOUNTER — TREATMENT (OUTPATIENT)
Dept: PHYSICAL THERAPY | Facility: CLINIC | Age: 39
End: 2024-05-08
Payer: OTHER MISCELLANEOUS

## 2024-05-08 DIAGNOSIS — S83.92XD SPRAIN OF LEFT KNEE, UNSPECIFIED LIGAMENT, SUBSEQUENT ENCOUNTER: Primary | ICD-10-CM

## 2024-05-08 DIAGNOSIS — R26.2 DIFFICULTY WALKING: ICD-10-CM

## 2024-05-09 ENCOUNTER — TREATMENT (OUTPATIENT)
Dept: PHYSICAL THERAPY | Facility: CLINIC | Age: 39
End: 2024-05-09
Payer: OTHER MISCELLANEOUS

## 2024-05-09 DIAGNOSIS — R26.2 DIFFICULTY WALKING: ICD-10-CM

## 2024-05-09 DIAGNOSIS — S83.92XD SPRAIN OF LEFT KNEE, UNSPECIFIED LIGAMENT, SUBSEQUENT ENCOUNTER: Primary | ICD-10-CM

## 2024-05-09 PROCEDURE — 97110 THERAPEUTIC EXERCISES: CPT | Performed by: PHYSICAL THERAPIST

## 2024-05-09 PROCEDURE — 97112 NEUROMUSCULAR REEDUCATION: CPT | Performed by: PHYSICAL THERAPIST

## 2024-05-09 PROCEDURE — 97530 THERAPEUTIC ACTIVITIES: CPT | Performed by: PHYSICAL THERAPIST

## 2024-05-16 ENCOUNTER — TREATMENT (OUTPATIENT)
Dept: PHYSICAL THERAPY | Facility: CLINIC | Age: 39
End: 2024-05-16
Payer: OTHER MISCELLANEOUS

## 2024-05-16 DIAGNOSIS — R26.2 DIFFICULTY WALKING: ICD-10-CM

## 2024-05-16 DIAGNOSIS — S83.92XD SPRAIN OF LEFT KNEE, UNSPECIFIED LIGAMENT, SUBSEQUENT ENCOUNTER: Primary | ICD-10-CM

## 2024-05-16 PROCEDURE — 97110 THERAPEUTIC EXERCISES: CPT | Performed by: PHYSICAL THERAPIST

## 2024-05-16 PROCEDURE — 97530 THERAPEUTIC ACTIVITIES: CPT | Performed by: PHYSICAL THERAPIST

## 2024-05-16 PROCEDURE — 97112 NEUROMUSCULAR REEDUCATION: CPT | Performed by: PHYSICAL THERAPIST

## 2024-05-16 NOTE — PROGRESS NOTES
Physical Therapy Daily Treatment Note      [facility]              [address], [address]                [place]  [address]        Patient: Getachew Hammer   : 1985  Diagnosis/ICD-10 Code:  Sprain of left knee, unspecified ligament, subsequent encounter [S83.92XD]  Referring practitioner: FILI Jordan  Date of Initial Visit: Type: THERAPY  Noted: 2024  Today's Date: 2024  Patient seen for 5 sessions         Subjective    Getachew Hammer reports: no pain today but his knee did ache a bit over the weekend and he isn't sure why.            Objective   See Exercise, Manual, and Modality Logs for complete treatment.       Assessment/Plan  Continued to progress strengthening and balance as tolerated.  Improved form and stability with all exercises.  No complaints with exercises.  Deep squats not performed today thus may need to floor transitions and deeper squats for return to fully duty as .  Exercises limited d/t time constraints from pt arriving late to session.     Progress per Plan of Care           Timed:  Manual Therapy:         mins  91577;  Therapeutic Exercise:    15     mins  05297;     Neuromuscular Natanael:    10    mins  29298;    Therapeutic Activity:     15     mins  55718;     Gait Training:           mins  95565;     Ultrasound:          mins  85060;     Work Conditioning/Hardening (initial 2 hours)        mins  67259  Work Conditioning/Hardening (each add'l hour)        mins  51310    Untimed:   Electrical Stimulation:         mins  50101 ( );  Traction          mins 86069    Timed Treatment:   40   mins   Total Treatment:     40   mins    Mayelin Reynoso PTA  Physical Therapist Assistant

## 2024-05-22 ENCOUNTER — TREATMENT (OUTPATIENT)
Dept: PHYSICAL THERAPY | Facility: CLINIC | Age: 39
End: 2024-05-22
Payer: OTHER MISCELLANEOUS

## 2024-05-22 DIAGNOSIS — R26.2 DIFFICULTY WALKING: ICD-10-CM

## 2024-05-22 DIAGNOSIS — S83.92XD SPRAIN OF LEFT KNEE, UNSPECIFIED LIGAMENT, SUBSEQUENT ENCOUNTER: Primary | ICD-10-CM

## 2024-05-22 PROCEDURE — 97530 THERAPEUTIC ACTIVITIES: CPT | Performed by: PHYSICAL THERAPIST

## 2024-05-22 PROCEDURE — 97110 THERAPEUTIC EXERCISES: CPT | Performed by: PHYSICAL THERAPIST

## 2024-05-22 NOTE — PROGRESS NOTES
"Re-Assessment / Re-Certification      Patient: Getachew Hammer   : 1985  Diagnosis/ICD-10 Code:  Sprain of left knee, unspecified ligament, subsequent encounter [S83.92XD]  Referring practitioner: No ref. provider found  Date of Initial Visit: Type: THERAPY  Noted: 2024  Today's Date: 2024  Patient seen for 6 sessions    Visit Diagnoses:      ICD-10-CM ICD-9-CM   1. Sprain of left knee, unspecified ligament, subsequent encounter  S83.92XD V58.89     844.9   2. Difficulty walking  R26.2 719.7       Subjective:     Getachew Hammer reports \"everything\" has improved since beginning therapy, including standing after having been sitting for a long period of time. He states he still has trouble with side to side motion but that he does feel it has improved. He also states that he still has some tenderness and \"soft spot\" at lateral anterior knee.    Subjective Questionnaire: Oxford Knee 35/48  Clinical Progress: improved  Home Program Compliance: Yes  Treatment has included: therapeutic exercise, neuromuscular re-education, and therapeutic activity    Subjective   Objective          Active Range of Motion   Left Knee   Flexion: 135 degrees     Strength/Myotome Testing     Left Knee   Flexion: 4+  Extension: 4+      Assessment & Plan       Assessment  Assessment details: Pt reports decrease in pain. He demonstrates increased L knee flex AROM and L knee flex and ext strength today. He has met or is progressing toward all goals. Oxford Knee score 35/48 today. Plan to continue higher level strengthening and stability exercises for full return to PLOF with decreased symptoms. Increased resistance of all strengthening exercises today. Pt stated it felt like he was \"working it.\"      Goals  Plan Goals: STGs 2-4 weeks:  1. Improve Avery knee score from 30/48 to > 40/48 -- PROGRESSING  2. Improve L knee flexion from 126 to 140 -- PROGRESSING, 135 today  3. Improve strength L knee from 4- to 4/5 lifting light to " "moderate weights -- MET  4. Pt to state no issue managing steps -- MET  5. Pt to exhibit good lateral stability with no pain and be released for full work duties -- PROGRESSING    LTGs 8-12 weeks:  1. Oxford score > 44/48 -- PROGRESSING  2. 4+ strength L knee lifting moderate to heavy weights -- MET  3. 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 Negative Thessally's test -- PROGRESSING, still reports \"a little bit\" of pain      Progress toward previous goals: Partially Met      Recommendations: Continue as planned  Timeframe: 1 month  Prognosis to achieve goals: good        Timed:         Manual Therapy:         mins  17491;     Therapeutic Exercise:    30     mins  59401;     Neuromuscular Natanael:        mins  45606;    Therapeutic Activity:     10     mins  20818;     Gait Training:           mins  91960;     Ultrasound:          mins  82134;    Ionto                                   mins   99347  Self Care                            mins   88807    Un-Timed:  Electrical Stimulation:         mins  68483 ( );  Traction          mins 21265  Re-Eval                               mins  05585      Timed Treatment:   40   mins   Total Treatment:     50   mins       PT Signature: Elvie Vaughan PT  [place] License #: [id number]  [place] License #: [id number]      Certification Period: 5/22/2024 thru 8/19/2024  I certify that the therapy services are furnished while this patient is under my care.  The services outlined above are required by this patient, and will be reviewed every 90 days.    Based upon review of the patient's progress and continued therapy plan, it is my medical opinion that Getachew Hammer should continue physical therapy treatment at [facility]  [address]  [place] [id number] [phone].           "

## 2024-05-24 ENCOUNTER — TREATMENT (OUTPATIENT)
Dept: PHYSICAL THERAPY | Facility: CLINIC | Age: 39
End: 2024-05-24
Payer: OTHER MISCELLANEOUS

## 2024-05-24 DIAGNOSIS — R26.2 DIFFICULTY WALKING: ICD-10-CM

## 2024-05-24 DIAGNOSIS — S83.92XD SPRAIN OF LEFT KNEE, UNSPECIFIED LIGAMENT, SUBSEQUENT ENCOUNTER: Primary | ICD-10-CM

## 2024-05-24 NOTE — PROGRESS NOTES
Physical Therapy Daily Treatment Note      [facility] [place]              [address], [address]                [place]  [address]        Patient: Getachew Hammer   : 1985  Diagnosis/ICD-10 Code:  Sprain of left knee, unspecified ligament, subsequent encounter [S83.92XD]  Referring practitioner: No ref. provider found  Date of Initial Visit: Type: THERAPY  Noted: 2024  Today's Date: 2024  Patient seen for 7 sessions         Subjective    Getachew Hammer reports: his pain isn't really bothering him today.  No pain today.            Objective   See Exercise, Manual, and Modality Logs for complete treatment.       Assessment/Plan  Able to progress exercises as noted.  He reported no increased pain or symptoms with progressions.  Minimal cues for technique.  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      Awaiting MD orders as pt has completed 8 PT visits from initial order.           Timed:  Manual Therapy:         mins  93499;  Therapeutic Exercise:    10     mins  10400;     Neuromuscular Natanael:    8    mins  26359;    Therapeutic Activity:     25     mins  17272;     Gait Training:           mins  39663;     Ultrasound:          mins  76714;     Work Conditioning/Hardening (initial 2 hours)        mins  29188  Work Conditioning/Hardening (each add'l hour)        mins  03523    Untimed:   Electrical Stimulation:         mins  14570 ( );  Traction          mins 19117    Timed Treatment:   43   mins   Total Treatment:     43   mins    Mayelin Reynoso PTA  Physical Therapist Assistant